# Patient Record
Sex: MALE | Race: WHITE | NOT HISPANIC OR LATINO | URBAN - METROPOLITAN AREA
[De-identification: names, ages, dates, MRNs, and addresses within clinical notes are randomized per-mention and may not be internally consistent; named-entity substitution may affect disease eponyms.]

---

## 2024-03-20 ENCOUNTER — INPATIENT (INPATIENT)
Facility: HOSPITAL | Age: 64
LOS: 0 days | Discharge: HOME | DRG: 390 | End: 2024-03-21
Attending: FAMILY MEDICINE | Admitting: FAMILY MEDICINE
Payer: COMMERCIAL

## 2024-03-20 VITALS
WEIGHT: 174.17 LBS | HEART RATE: 105 BPM | OXYGEN SATURATION: 96 % | TEMPERATURE: 98 F | SYSTOLIC BLOOD PRESSURE: 155 MMHG | HEIGHT: 67 IN | RESPIRATION RATE: 20 BRPM | DIASTOLIC BLOOD PRESSURE: 97 MMHG

## 2024-03-20 DIAGNOSIS — K56.609 UNSPECIFIED INTESTINAL OBSTRUCTION, UNSPECIFIED AS TO PARTIAL VERSUS COMPLETE OBSTRUCTION: ICD-10-CM

## 2024-03-20 DIAGNOSIS — I10 ESSENTIAL (PRIMARY) HYPERTENSION: ICD-10-CM

## 2024-03-20 DIAGNOSIS — Z90.49 ACQUIRED ABSENCE OF OTHER SPECIFIED PARTS OF DIGESTIVE TRACT: Chronic | ICD-10-CM

## 2024-03-20 LAB
ALBUMIN SERPL ELPH-MCNC: 3.5 G/DL — SIGNIFICANT CHANGE UP (ref 3.3–5)
ALP SERPL-CCNC: 74 U/L — SIGNIFICANT CHANGE UP (ref 30–120)
ALT FLD-CCNC: 115 U/L — HIGH (ref 10–60)
ANION GAP SERPL CALC-SCNC: 10 MMOL/L — SIGNIFICANT CHANGE UP (ref 5–17)
APPEARANCE UR: CLEAR — SIGNIFICANT CHANGE UP
APTT BLD: 29.6 SEC — SIGNIFICANT CHANGE UP (ref 24.5–35.6)
AST SERPL-CCNC: 42 U/L — HIGH (ref 10–40)
BACTERIA # UR AUTO: NEGATIVE /HPF — SIGNIFICANT CHANGE UP
BASOPHILS # BLD AUTO: 0.03 K/UL — SIGNIFICANT CHANGE UP (ref 0–0.2)
BASOPHILS NFR BLD AUTO: 0.6 % — SIGNIFICANT CHANGE UP (ref 0–2)
BILIRUB SERPL-MCNC: 0.6 MG/DL — SIGNIFICANT CHANGE UP (ref 0.2–1.2)
BILIRUB UR-MCNC: NEGATIVE — SIGNIFICANT CHANGE UP
BLD GP AB SCN SERPL QL: SIGNIFICANT CHANGE UP
BUN SERPL-MCNC: 36 MG/DL — HIGH (ref 7–23)
CALCIUM SERPL-MCNC: 11 MG/DL — HIGH (ref 8.4–10.5)
CHLORIDE SERPL-SCNC: 108 MMOL/L — SIGNIFICANT CHANGE UP (ref 96–108)
CO2 SERPL-SCNC: 27 MMOL/L — SIGNIFICANT CHANGE UP (ref 22–31)
COLOR SPEC: YELLOW — SIGNIFICANT CHANGE UP
CREAT SERPL-MCNC: 1.51 MG/DL — HIGH (ref 0.5–1.3)
DIFF PNL FLD: ABNORMAL
EGFR: 52 ML/MIN/1.73M2 — LOW
EOSINOPHIL # BLD AUTO: 0.07 K/UL — SIGNIFICANT CHANGE UP (ref 0–0.5)
EOSINOPHIL NFR BLD AUTO: 1.3 % — SIGNIFICANT CHANGE UP (ref 0–6)
EPI CELLS # UR: PRESENT
GLUCOSE SERPL-MCNC: 166 MG/DL — HIGH (ref 70–99)
GLUCOSE UR QL: NEGATIVE MG/DL — SIGNIFICANT CHANGE UP
HCT VFR BLD CALC: 53.8 % — HIGH (ref 39–50)
HGB BLD-MCNC: 17.7 G/DL — HIGH (ref 13–17)
IMM GRANULOCYTES NFR BLD AUTO: 0.2 % — SIGNIFICANT CHANGE UP (ref 0–0.9)
INR BLD: 1.09 RATIO — SIGNIFICANT CHANGE UP (ref 0.85–1.18)
KETONES UR-MCNC: NEGATIVE MG/DL — SIGNIFICANT CHANGE UP
LACTATE SERPL-SCNC: 1.2 MMOL/L — SIGNIFICANT CHANGE UP (ref 0.7–2)
LEUKOCYTE ESTERASE UR-ACNC: NEGATIVE — SIGNIFICANT CHANGE UP
LIDOCAIN IGE QN: 16 U/L — SIGNIFICANT CHANGE UP (ref 16–77)
LYMPHOCYTES # BLD AUTO: 0.42 K/UL — LOW (ref 1–3.3)
LYMPHOCYTES # BLD AUTO: 7.9 % — LOW (ref 13–44)
MCHC RBC-ENTMCNC: 30.8 PG — SIGNIFICANT CHANGE UP (ref 27–34)
MCHC RBC-ENTMCNC: 32.9 GM/DL — SIGNIFICANT CHANGE UP (ref 32–36)
MCV RBC AUTO: 93.7 FL — SIGNIFICANT CHANGE UP (ref 80–100)
MONOCYTES # BLD AUTO: 0.68 K/UL — SIGNIFICANT CHANGE UP (ref 0–0.9)
MONOCYTES NFR BLD AUTO: 12.7 % — SIGNIFICANT CHANGE UP (ref 2–14)
NEUTROPHILS # BLD AUTO: 4.14 K/UL — SIGNIFICANT CHANGE UP (ref 1.8–7.4)
NEUTROPHILS NFR BLD AUTO: 77.3 % — HIGH (ref 43–77)
NITRITE UR-MCNC: NEGATIVE — SIGNIFICANT CHANGE UP
NRBC # BLD: 0 /100 WBCS — SIGNIFICANT CHANGE UP (ref 0–0)
PH UR: 5.5 — SIGNIFICANT CHANGE UP (ref 5–8)
PLATELET # BLD AUTO: 175 K/UL — SIGNIFICANT CHANGE UP (ref 150–400)
POTASSIUM SERPL-MCNC: 4.7 MMOL/L — SIGNIFICANT CHANGE UP (ref 3.5–5.3)
POTASSIUM SERPL-SCNC: 4.7 MMOL/L — SIGNIFICANT CHANGE UP (ref 3.5–5.3)
PROT SERPL-MCNC: 7.2 G/DL — SIGNIFICANT CHANGE UP (ref 6–8.3)
PROT UR-MCNC: SIGNIFICANT CHANGE UP MG/DL
PROTHROM AB SERPL-ACNC: 11.9 SEC — SIGNIFICANT CHANGE UP (ref 9.5–13)
RBC # BLD: 5.74 M/UL — SIGNIFICANT CHANGE UP (ref 4.2–5.8)
RBC # FLD: 12.7 % — SIGNIFICANT CHANGE UP (ref 10.3–14.5)
RBC CASTS # UR COMP ASSIST: 1 /HPF — SIGNIFICANT CHANGE UP (ref 0–4)
SODIUM SERPL-SCNC: 145 MMOL/L — SIGNIFICANT CHANGE UP (ref 135–145)
SP GR SPEC: 1.02 — SIGNIFICANT CHANGE UP (ref 1–1.03)
UROBILINOGEN FLD QL: 1 MG/DL — SIGNIFICANT CHANGE UP (ref 0.2–1)
WBC # BLD: 5.13 K/UL — SIGNIFICANT CHANGE UP (ref 3.8–10.5)
WBC # FLD AUTO: 5.13 K/UL — SIGNIFICANT CHANGE UP (ref 3.8–10.5)
WBC UR QL: 2 /HPF — SIGNIFICANT CHANGE UP (ref 0–5)

## 2024-03-20 PROCEDURE — 99223 1ST HOSP IP/OBS HIGH 75: CPT

## 2024-03-20 PROCEDURE — 93010 ELECTROCARDIOGRAM REPORT: CPT

## 2024-03-20 PROCEDURE — 99285 EMERGENCY DEPT VISIT HI MDM: CPT

## 2024-03-20 PROCEDURE — 74177 CT ABD & PELVIS W/CONTRAST: CPT | Mod: 26,MC

## 2024-03-20 PROCEDURE — 74019 RADEX ABDOMEN 2 VIEWS: CPT | Mod: 26

## 2024-03-20 RX ORDER — DIATRIZOATE MEGLUMINE 180 MG/ML
100 INJECTION, SOLUTION INTRAVESICAL ONCE
Refills: 0 | Status: DISCONTINUED | OUTPATIENT
Start: 2024-03-20 | End: 2024-03-21

## 2024-03-20 RX ORDER — SODIUM CHLORIDE 9 MG/ML
1000 INJECTION INTRAMUSCULAR; INTRAVENOUS; SUBCUTANEOUS ONCE
Refills: 0 | Status: COMPLETED | OUTPATIENT
Start: 2024-03-20 | End: 2024-03-20

## 2024-03-20 RX ORDER — PANTOPRAZOLE SODIUM 20 MG/1
40 TABLET, DELAYED RELEASE ORAL DAILY
Refills: 0 | Status: DISCONTINUED | OUTPATIENT
Start: 2024-03-20 | End: 2024-03-21

## 2024-03-20 RX ORDER — LISINOPRIL 2.5 MG/1
1 TABLET ORAL
Refills: 0 | DISCHARGE

## 2024-03-20 RX ORDER — MORPHINE SULFATE 50 MG/1
4 CAPSULE, EXTENDED RELEASE ORAL ONCE
Refills: 0 | Status: DISCONTINUED | OUTPATIENT
Start: 2024-03-20 | End: 2024-03-20

## 2024-03-20 RX ORDER — ONDANSETRON 8 MG/1
4 TABLET, FILM COATED ORAL EVERY 6 HOURS
Refills: 0 | Status: DISCONTINUED | OUTPATIENT
Start: 2024-03-20 | End: 2024-03-21

## 2024-03-20 RX ORDER — SODIUM CHLORIDE 9 MG/ML
1000 INJECTION, SOLUTION INTRAVENOUS
Refills: 0 | Status: DISCONTINUED | OUTPATIENT
Start: 2024-03-20 | End: 2024-03-21

## 2024-03-20 RX ORDER — IOHEXOL 300 MG/ML
30 INJECTION, SOLUTION INTRAVENOUS ONCE
Refills: 0 | Status: COMPLETED | OUTPATIENT
Start: 2024-03-20 | End: 2024-03-20

## 2024-03-20 RX ORDER — LANOLIN ALCOHOL/MO/W.PET/CERES
3 CREAM (GRAM) TOPICAL AT BEDTIME
Refills: 0 | Status: DISCONTINUED | OUTPATIENT
Start: 2024-03-20 | End: 2024-03-21

## 2024-03-20 RX ORDER — ACETAMINOPHEN 500 MG
650 TABLET ORAL EVERY 6 HOURS
Refills: 0 | Status: DISCONTINUED | OUTPATIENT
Start: 2024-03-20 | End: 2024-03-21

## 2024-03-20 RX ORDER — ONDANSETRON 8 MG/1
4 TABLET, FILM COATED ORAL ONCE
Refills: 0 | Status: COMPLETED | OUTPATIENT
Start: 2024-03-20 | End: 2024-03-20

## 2024-03-20 RX ADMIN — MORPHINE SULFATE 4 MILLIGRAM(S): 50 CAPSULE, EXTENDED RELEASE ORAL at 15:44

## 2024-03-20 RX ADMIN — MORPHINE SULFATE 4 MILLIGRAM(S): 50 CAPSULE, EXTENDED RELEASE ORAL at 15:26

## 2024-03-20 RX ADMIN — SODIUM CHLORIDE 1000 MILLILITER(S): 9 INJECTION INTRAMUSCULAR; INTRAVENOUS; SUBCUTANEOUS at 20:11

## 2024-03-20 RX ADMIN — SODIUM CHLORIDE 1000 MILLILITER(S): 9 INJECTION INTRAMUSCULAR; INTRAVENOUS; SUBCUTANEOUS at 16:59

## 2024-03-20 RX ADMIN — ONDANSETRON 4 MILLIGRAM(S): 8 TABLET, FILM COATED ORAL at 15:25

## 2024-03-20 RX ADMIN — SODIUM CHLORIDE 75 MILLILITER(S): 9 INJECTION, SOLUTION INTRAVENOUS at 23:39

## 2024-03-20 RX ADMIN — IOHEXOL 30 MILLILITER(S): 300 INJECTION, SOLUTION INTRAVENOUS at 15:24

## 2024-03-20 RX ADMIN — SODIUM CHLORIDE 75 MILLILITER(S): 9 INJECTION, SOLUTION INTRAVENOUS at 20:13

## 2024-03-20 RX ADMIN — SODIUM CHLORIDE 1000 MILLILITER(S): 9 INJECTION INTRAMUSCULAR; INTRAVENOUS; SUBCUTANEOUS at 15:24

## 2024-03-20 NOTE — PATIENT PROFILE ADULT - MONEY FOR FOOD
Assuming that the chest tightness noted has been over the course of her bronchitis, would recommend titration of her furosemide to 40 mg daily and limit total fluids to less than 64 oz and also monitor sodium intake.  She was also managed with a course of prednisone which could be causing the weight gain and swelling.  She should stay at the higher dose of furosemide through the weekend and contact us Monday for an update.  Would like her to take an extra tablet today as well.    If the chest tightness is a new symptom not associated with her bronchitis it would be more appropriate to get this re-evaluated in the ER to ensure this is not an ischemic symptom.   no

## 2024-03-20 NOTE — ED ADULT NURSE NOTE - CHIEF COMPLAINT
Patients daughter states that [facility] did not receive the prescription for docusate sodium-sennosides (Senna Plus) 50-8.6 MG per tablet today.  Daughter states that she would like the prescription sent to [facility] instead. Patient was just seen by Dr Schoenich today and the prescription was sent.    Please call daughter if you have any questions.    [id number] - [place] [address] [address] AT [facility]         
Script sent to [facility] per daughters request  
The patient is a 64y Male complaining of abdominal pain.

## 2024-03-20 NOTE — H&P ADULT - PROBLEM SELECTOR PLAN 1
Admit  NPO  Surgery attempted to put NGT but was unable  Will give gastrograffin  IVF  IV PPI  IV Zofran prn  Avoid narcotic pain meds  Surgery consult  Further work-up/management pending clinical course.

## 2024-03-20 NOTE — PATIENT PROFILE ADULT - FALL HARM RISK - UNIVERSAL INTERVENTIONS
Bed in lowest position, wheels locked, appropriate side rails in place/Call bell, personal items and telephone in reach/Instruct patient to call for assistance before getting out of bed or chair/Non-slip footwear when patient is out of bed/Boaz to call system/Physically safe environment - no spills, clutter or unnecessary equipment/Purposeful Proactive Rounding/Room/bathroom lighting operational, light cord in reach

## 2024-03-20 NOTE — ED PROVIDER NOTE - CLINICAL SUMMARY MEDICAL DECISION MAKING FREE TEXT BOX
Patient referred from urgent care for 3 days abdominal pain distention nausea and vomiting.  Patient reports appendectomy at age 15 history of SBO approximately 20 years ago which resolved with an NG tube.  Patient reports fever (Tmax 101.3) at home.  Patient reports had diarrhea but no constipation.  No chest pain shortness of breath cough urinary complaints.    Plan EKG labs abdominal x-ray CT abdomen pelvis with p.o. and IV contrast IV fluids morphine Zofran    Differential including but not limited to SBO constipation perforated viscus electrolyte abnormality dehydration

## 2024-03-20 NOTE — ED PROVIDER NOTE - DIFFERENTIAL DIAGNOSIS
Differential Diagnosis Differential including but not limited to SBO constipation perforated viscus electrolyte abnormality dehydration

## 2024-03-20 NOTE — CONSULT NOTE ADULT - SUBJECTIVE AND OBJECTIVE BOX
contact information: Office: [phone] Cell: [phone]    GENERAL SURGERY CONSULT NOTE    Patient is a 64y old  Male who presents with a chief complaint of abdominal pain with N/V since Sunday night  patient with h/o open appendectomy at age 16 , presented with 3 day h/o abdominal pain and vomiting, decrease appetite and diarrhea. patient with recent out patient buttock cyst removal under anesthesia states he has been constipated since surgery last Wednesday. patient then noted fever and chills starting sat night combined with diarrhea which was diagnosed by urgent care as enteritis. patient has not have any fevers since Monday but continues to have few episode of non bloody diarrhea and few episode of vomiting. patient w + flatus through out.   off note patient was admitted w SBO due to hydroureter which was resolved with no surgical intervention.  no other complaints at this time                < from: CT Abdomen and Pelvis w/ Oral Cont and w/ IV Cont (03.20.24 @ 17:55) >  BLADDER: Within normal limits.  REPRODUCTIVE ORGANS: Mildly enlarged prostate.    BOWEL: Dilated long segment of small bowel concerningfor small bowel   obstruction. A transition point is seen along the anterior abdominal wall   on series 3 image 91). There is no pneumatosis intestinalis or portal   venous gas. No abnormal bowel thickening or interval fluid. Appendix is   not visualized. No evidence of inflammation in the pericecal region.   Extensive colonic diverticulosis without diverticulitis.  PERITONEUM: No ascites.  VESSELS: Within normal limits.  RETROPERITONEUM/LYMPH NODES: No lymphadenopathy.  ABDOMINAL WALL: 3.5 cm fat-containing umbilical hernia..  BONES: Degenerative changes.    IMPRESSION:  Small bowel obstruction identified with transition point along the   anterior abdominal wall most likely related to adhesion. No evidence of   bowel ischemia at this time.              PAST MEDICAL & SURGICAL HISTORY:  HTN (hypertension)      SBO (small bowel obstruction)      History of appendectomy          Review of Systems:    I have reviewed 9 systems with the patient and the only positive findings were as above     MEDICATIONS  (STANDING):    MEDICATIONS  (PRN):      Allergies    No Known Allergies    Intolerances        SOCIAL HISTORY          Smoking: Yes [ ]  No [ ]   ______pk yrs          ETOH  Yes [ ]  No [ ]  Social [ ]          DRUGS:  Yes [ ]  No [ ]  if so what______________    FAMILY HISTORY:      Vital Signs Last 24 Hrs  T(C): 36.5 (20 Mar 2024 14:38), Max: 36.5 (20 Mar 2024 14:38)  T(F): 97.7 (20 Mar 2024 14:38), Max: 97.7 (20 Mar 2024 14:38)  HR: 105 (20 Mar 2024 14:38) (105 - 105)  BP: 155/97 (20 Mar 2024 14:38) (155/97 - 155/97)  BP(mean): --  RR: 20 (20 Mar 2024 14:38) (20 - 20)  SpO2: 96% (20 Mar 2024 14:38) (96% - 96%)    Parameters below as of 20 Mar 2024 14:38  Patient On (Oxygen Delivery Method): room air        Physical Exam:    General:  Appears stated age, well-groomed, well-nourished, no distress  Eyes : DON  HENT:  WNL, no JVD  Chest:  clear breath sounds good inspiratory effort   Cardiovascular:  Regular rate & rhythm  Abdomen:  distended , mildly tympanic, + bs with large umbilical hernia , no guarding no rebound   Extremities:  no edema , good capillary refill   Skin:  No rash  Musculoskeletal:  normal strength  Neuro/Psych:  Alert, oriented tp time, place and person       LABS:                        17.7   5.13  )-----------( 175      ( 20 Mar 2024 15:05 )             53.8     03-20    145  |  108  |  36<H>  ----------------------------<  166<H>  4.7   |  27  |  1.51<H>    Ca    11.0<H>      20 Mar 2024 15:05    TPro  7.2  /  Alb  3.5  /  TBili  0.6  /  DBili  x   /  AST  42<H>  /  ALT  115<H>  /  AlkPhos  74  03-20    PT/INR - ( 20 Mar 2024 15:05 )   PT: 11.9 sec;   INR: 1.09 ratio         PTT - ( 20 Mar 2024 15:05 )  PTT:29.6 sec  Urinalysis Basic - ( 20 Mar 2024 15:05 )    Color: x / Appearance: x / SG: x / pH: x  Gluc: 166 mg/dL / Ketone: x  / Bili: x / Urobili: x   Blood: x / Protein: x / Nitrite: x   Leuk Esterase: x / RBC: x / WBC x   Sq Epi: x / Non Sq Epi: x / Bacteria: x

## 2024-03-20 NOTE — ED ADULT NURSE NOTE - OBJECTIVE STATEMENT
pt comes to ed sent from urgent care for CT to r/o SBO. Pt c/o abd pain, bloating x few days worse yesterday with some n/v. pt states had some diarrhea yesterday and is able to pass gas currently. pt denies, back pain, neck pain, recent travel, sick contacts, headache, numbness, tingling, weakness, blurred vision, sinus congestion, fevers, chills, body aches, hematuria, dizziness, chest pain, sob, trouble speaking, trouble walking or any other complaints.

## 2024-03-20 NOTE — ED PROVIDER NOTE - NS ED MD DISPO ISOLATION TYPES
Patient has chest pain that started yesterday afternoon.  She complains of right side pain into her shoulder and nausea    Patient was advised and in agreement they do not meet Urgent Care criteria based on triage symptoms.   
None

## 2024-03-20 NOTE — ED ADULT NURSE REASSESSMENT NOTE - NS ED NURSE REASSESS COMMENT FT1
surgery RAFFY No at bedside attempting NG tube
pt resting comfortably, in no acute distress. Respirations are even and unlabored. unable to place NG tube at bedside, RAFFY Martin and Dr. Issa made aware. plan of care ongoing, no further concerns as of present, pt repositioned in bed, pt hemodynamically stable. no acute changes noted, safety maintained, call bell within reach.

## 2024-03-20 NOTE — PROGRESS NOTE ADULT - ASSESSMENT
I was called by ER about the pt.,who has 3  days ho abd pain , N/V and abdominal distension.  Pt. had appendectomy at the age of 15.  In 2011 was seen in the ER for abd distension, possible sbo vs an ileus, which resolved without surgical intervention. Ambrose episode coincided with renal stone ambrose cause hydrouretrr and require a placement of ureteral stent..  Since that time had no GI issues.  AXR shows dilated sb loops. He is awaiting ct scan.  His labs  reflect renal compromise with elevated BUN/Cr. normal lactic acid. His wbc is normal with elevation of neutrophils .  Pt. should be rehydrated.   Ngt should be placed.  U/A should be obtained.  Picture could be secondary to sbo vs ileus eduardo. since there is a history of previous renal stones causing hydroureter.   Will follow ct.  IF U/A or ct are positive for blood, infection, hydroureter the  consult would be required.  Full consult to follow. I was called by ER about the pt.,who has 3  days ho abd pain , N/V and abdominal distension. Pt is passing flatus and had diarrhea yesterday  Pt. had appendectomy at the age of 15.  In 2011 was seen in the ER for abd distension, possible sbo vs an ileus, which resolved without surgical intervention. That episode coincided with renal stone ambrose cause hydrouretrr and require a placement of ureteral stent..  Since that time had no GI issues.  AXR shows dilated sb loops. He is awaiting ct scan.  His labs  reflect renal compromise with elevated BUN/Cr. normal lactic acid. His wbc is normal with elevation of neutrophils .  Pt. should be rehydrated.   Ngt should be placed.  U/A should be obtained.  Picture could be secondary to sbo vs ileus eduardo. since there is a history of previous renal stones causing hydroureter.   Will follow ct.  IF U/A or ct are positive for blood, infection, hydroureter the  consult would be required.  Full consult to follow.

## 2024-03-20 NOTE — ED ADULT TRIAGE NOTE - CHIEF COMPLAINT QUOTE
" I have abdominal pain - worse last night  " Pt sent in  from an urgent care for nausea , vomiting and abdominal pain x 3 days now  PMH Bowel Obstruction

## 2024-03-20 NOTE — H&P ADULT - PROBLEM SELECTOR PLAN 2
Patient currently normotensive  Will hold off on meds for now -- if needed can start iv lopressor 2.5mg ivp q6h

## 2024-03-20 NOTE — ED PROVIDER NOTE - CPE EDP RESP NORM
[facility],    Thank you for allowing [facility] to manage your care.      I sent your prescriptions to your pharmacy.      I made a referral, they will be calling in approximately 1 week to set up your appointment.  If you do not hear from them, please call the specialty number on your after visit.     For your convenience, test results are released as soon as they are available  Please allow 1-2 business days for me to send you a comment about your results.  If not done so, I encourage you to login into [employer] ([email or web address] to review your results in real time.     If you have any questions or concerns, please feel free to call us at [phone].    Sincerely,    Dr. Alfaro    Did you know?      You can schedule a video visit for follow-up appointments as well as future appointments for certain conditions.  Please see the below link.     [email or web address]    If you have not already done so,  I encourage you to sign up for [employer] ([email or web address]).  This will allow you to review your results, securely communicate with a provider, and schedule virtual visits as well.     normal...

## 2024-03-20 NOTE — ED PROVIDER NOTE - PROGRESS NOTE DETAILS
dw dr metz (surgery attending) who states NG tube and admit  dw dr THAD wilson (hospitalist attending) who accepts (covering alamuri)

## 2024-03-20 NOTE — H&P ADULT - HISTORY OF PRESENT ILLNESS
This is a 64 M with PMH of HTN, open appendectomy at age 15, SBO ~20 years ago (resolved with NG tube) hypercalcemia (?2/2 to hyperparathyroidism) who came in c/o abdominal pain, distension, nausea, vomiting x 3 days. Patient reports that he had a buttock cyst removed 8 days ago. He reports being constipated since then. Tmax 101.3F at home two days ago. Decreased PO intake due to pain. Small amounts of diarrhea. Passing flatus. Denies cp, sob, urinary complaints. He went to urgent care yesterday and diagnosed with enteritis. Today he felt worse so he came to the ER.

## 2024-03-20 NOTE — ED PROVIDER NOTE - OBJECTIVE STATEMENT
64 M hx htn, appendectomy age 15, SBO ~20 years ago (resolved with NG tube) sent from urgent care for abd pain, distension, nausea, vomiting x 3 days. Tmax 101.3F at home. Decreased PO intake due to pain. Small amounts of diarrhea. Passing flatus. Denies cp, sob, urinary complaints.

## 2024-03-20 NOTE — ED PROVIDER NOTE - CONSTITUTIONAL, MLM
normal... Well appearing, awake, alert, oriented to person, place, time/situation in mild distress from pain.

## 2024-03-20 NOTE — CONSULT NOTE ADULT - ASSESSMENT
65 yo male with h/o open appendectomy and renal stone presented with abdominal pain , diarrhea and N/V since Sunday night after diagnosis of enteritis and prior complaints of constipation post out patient surgical procedure on Wednesday.   plan:  admit to medical service   will decompress w NGT placement although patient is very apprehensive about NGT placement due to prior experience  hydration  U/A   serial abdominal exam    possible gastrografin challenge in AM

## 2024-03-21 ENCOUNTER — TRANSCRIPTION ENCOUNTER (OUTPATIENT)
Age: 64
End: 2024-03-21

## 2024-03-21 VITALS
DIASTOLIC BLOOD PRESSURE: 83 MMHG | HEART RATE: 80 BPM | SYSTOLIC BLOOD PRESSURE: 156 MMHG | OXYGEN SATURATION: 93 % | RESPIRATION RATE: 18 BRPM | TEMPERATURE: 98 F

## 2024-03-21 LAB
ALBUMIN SERPL ELPH-MCNC: 2.9 G/DL — LOW (ref 3.3–5)
ALP SERPL-CCNC: 58 U/L — SIGNIFICANT CHANGE UP (ref 30–120)
ALT FLD-CCNC: 80 U/L — HIGH (ref 10–60)
ANION GAP SERPL CALC-SCNC: 8 MMOL/L — SIGNIFICANT CHANGE UP (ref 5–17)
AST SERPL-CCNC: 31 U/L — SIGNIFICANT CHANGE UP (ref 10–40)
BILIRUB DIRECT SERPL-MCNC: 0.2 MG/DL — SIGNIFICANT CHANGE UP (ref 0–0.3)
BILIRUB INDIRECT FLD-MCNC: 0.4 MG/DL — SIGNIFICANT CHANGE UP (ref 0.2–1)
BILIRUB SERPL-MCNC: 0.6 MG/DL — SIGNIFICANT CHANGE UP (ref 0.2–1.2)
BUN SERPL-MCNC: 32 MG/DL — HIGH (ref 7–23)
CALCIUM SERPL-MCNC: 10 MG/DL — SIGNIFICANT CHANGE UP (ref 8.4–10.5)
CHLORIDE SERPL-SCNC: 113 MMOL/L — HIGH (ref 96–108)
CO2 SERPL-SCNC: 26 MMOL/L — SIGNIFICANT CHANGE UP (ref 22–31)
CREAT SERPL-MCNC: 1.24 MG/DL — SIGNIFICANT CHANGE UP (ref 0.5–1.3)
EGFR: 65 ML/MIN/1.73M2 — SIGNIFICANT CHANGE UP
GLUCOSE SERPL-MCNC: 86 MG/DL — SIGNIFICANT CHANGE UP (ref 70–99)
HCT VFR BLD CALC: 44.5 % — SIGNIFICANT CHANGE UP (ref 39–50)
HCV AB S/CO SERPL IA: 0.09 S/CO — SIGNIFICANT CHANGE UP (ref 0–0.99)
HCV AB SERPL-IMP: SIGNIFICANT CHANGE UP
HGB BLD-MCNC: 14.8 G/DL — SIGNIFICANT CHANGE UP (ref 13–17)
MAGNESIUM SERPL-MCNC: 1.6 MG/DL — SIGNIFICANT CHANGE UP (ref 1.6–2.6)
MCHC RBC-ENTMCNC: 31.4 PG — SIGNIFICANT CHANGE UP (ref 27–34)
MCHC RBC-ENTMCNC: 33.3 GM/DL — SIGNIFICANT CHANGE UP (ref 32–36)
MCV RBC AUTO: 94.5 FL — SIGNIFICANT CHANGE UP (ref 80–100)
NRBC # BLD: 0 /100 WBCS — SIGNIFICANT CHANGE UP (ref 0–0)
PLATELET # BLD AUTO: 169 K/UL — SIGNIFICANT CHANGE UP (ref 150–400)
POTASSIUM SERPL-MCNC: 4.2 MMOL/L — SIGNIFICANT CHANGE UP (ref 3.5–5.3)
POTASSIUM SERPL-SCNC: 4.2 MMOL/L — SIGNIFICANT CHANGE UP (ref 3.5–5.3)
PROT SERPL-MCNC: 5.9 G/DL — LOW (ref 6–8.3)
RBC # BLD: 4.71 M/UL — SIGNIFICANT CHANGE UP (ref 4.2–5.8)
RBC # FLD: 12.7 % — SIGNIFICANT CHANGE UP (ref 10.3–14.5)
SODIUM SERPL-SCNC: 147 MMOL/L — HIGH (ref 135–145)
WBC # BLD: 3.8 K/UL — SIGNIFICANT CHANGE UP (ref 3.8–10.5)
WBC # FLD AUTO: 3.8 K/UL — SIGNIFICANT CHANGE UP (ref 3.8–10.5)

## 2024-03-21 PROCEDURE — 83690 ASSAY OF LIPASE: CPT

## 2024-03-21 PROCEDURE — 80048 BASIC METABOLIC PNL TOTAL CA: CPT

## 2024-03-21 PROCEDURE — 99233 SBSQ HOSP IP/OBS HIGH 50: CPT

## 2024-03-21 PROCEDURE — 86803 HEPATITIS C AB TEST: CPT

## 2024-03-21 PROCEDURE — 80076 HEPATIC FUNCTION PANEL: CPT

## 2024-03-21 PROCEDURE — 85730 THROMBOPLASTIN TIME PARTIAL: CPT

## 2024-03-21 PROCEDURE — 85025 COMPLETE CBC W/AUTO DIFF WBC: CPT

## 2024-03-21 PROCEDURE — 96375 TX/PRO/DX INJ NEW DRUG ADDON: CPT

## 2024-03-21 PROCEDURE — 74019 RADEX ABDOMEN 2 VIEWS: CPT

## 2024-03-21 PROCEDURE — 86901 BLOOD TYPING SEROLOGIC RH(D): CPT

## 2024-03-21 PROCEDURE — 36415 COLL VENOUS BLD VENIPUNCTURE: CPT

## 2024-03-21 PROCEDURE — 80053 COMPREHEN METABOLIC PANEL: CPT

## 2024-03-21 PROCEDURE — 74177 CT ABD & PELVIS W/CONTRAST: CPT | Mod: MC

## 2024-03-21 PROCEDURE — 83605 ASSAY OF LACTIC ACID: CPT

## 2024-03-21 PROCEDURE — 87040 BLOOD CULTURE FOR BACTERIA: CPT

## 2024-03-21 PROCEDURE — 99285 EMERGENCY DEPT VISIT HI MDM: CPT

## 2024-03-21 PROCEDURE — 85027 COMPLETE CBC AUTOMATED: CPT

## 2024-03-21 PROCEDURE — 81001 URINALYSIS AUTO W/SCOPE: CPT

## 2024-03-21 PROCEDURE — 86850 RBC ANTIBODY SCREEN: CPT

## 2024-03-21 PROCEDURE — 83735 ASSAY OF MAGNESIUM: CPT

## 2024-03-21 PROCEDURE — 86900 BLOOD TYPING SEROLOGIC ABO: CPT

## 2024-03-21 PROCEDURE — 85610 PROTHROMBIN TIME: CPT

## 2024-03-21 PROCEDURE — 93005 ELECTROCARDIOGRAM TRACING: CPT

## 2024-03-21 PROCEDURE — 96374 THER/PROPH/DIAG INJ IV PUSH: CPT

## 2024-03-21 PROCEDURE — 74019 RADEX ABDOMEN 2 VIEWS: CPT | Mod: 26

## 2024-03-21 RX ORDER — POLYETHYLENE GLYCOL 3350 17 G/17G
17 POWDER, FOR SOLUTION ORAL
Refills: 0 | Status: DISCONTINUED | OUTPATIENT
Start: 2024-03-21 | End: 2024-03-21

## 2024-03-21 RX ORDER — POLYETHYLENE GLYCOL 3350 17 G/17G
17 POWDER, FOR SOLUTION ORAL ONCE
Refills: 0 | Status: COMPLETED | OUTPATIENT
Start: 2024-03-21 | End: 2024-03-21

## 2024-03-21 RX ADMIN — SODIUM CHLORIDE 75 MILLILITER(S): 9 INJECTION, SOLUTION INTRAVENOUS at 10:54

## 2024-03-21 RX ADMIN — POLYETHYLENE GLYCOL 3350 17 GRAM(S): 17 POWDER, FOR SOLUTION ORAL at 10:54

## 2024-03-21 RX ADMIN — PANTOPRAZOLE SODIUM 40 MILLIGRAM(S): 20 TABLET, DELAYED RELEASE ORAL at 10:55

## 2024-03-21 NOTE — CARE COORDINATION ASSESSMENT. - NSDCPLANSERVICES_GEN_ALL_CORE
PATIENT SITTING UP IN BED TAKING HIS BREAKFAST. VITAL SIGNS AND I&O DONE. CALL
LIGHT WITHIN REACH. NO OTHER NEEDS AT THIS TIME This is a 64 M with PMH of HTN, open appendectomy at age 15, SBO ~20 years ago (resolved with NG tube) hypercalcemia (?2/2 to hyperparathyroidism) who came in c/o abdominal pain, distension, nausea, vomiting x 3 days. Patient reports that he had a buttock cyst removed 8 days ago. He reports being constipated since then.  CM met with patient at bedside.   Pt  resting comfortably / Pt has no complaints at this time.  CM explained role of CM and availability to assist with discharge planning throughout stay.   Provided CM contact information and pt. verbalized understanding. Patient lives in a private house with his wife, 9 steps with HR to navigate. Prior to admission patient was ind in adls including driving and working. Patient denies ant services ant at home and DME'S. Patient identified his wife  as his caregiver at home as needed, but patient states he is ind at this time. Anticipated needs not clear at this time, anticipate no needs for discharge.  CM explained home care expectations, process, insurance provisions and home safety with good understanding.   Offered list of [facility] and pt. chose [facility] at home care agency.  Provided discharge resources folder. CM will make  referral accordingly if appropriate.   Pt verbalizing understanding and in agreement with d/c plans.  pcp: Mert Banks  pharmacy trans transcript [phone]/Anticipated Needs Unclear at Present

## 2024-03-21 NOTE — DISCHARGE NOTE PROVIDER - INSTRUCTIONS
Full liquid diet today and tomorrow  Continue full liquid diet on 3/21 and 3/22. Advance to solid foods Saturday, March 23rd.

## 2024-03-21 NOTE — DISCHARGE NOTE NURSING/CASE MANAGEMENT/SOCIAL WORK - PATIENT PORTAL LINK FT
You can access the FollowMyHealth Patient Portal offered by [facility] by registering at the following website: [email or web address]. By joining [employer]’s FollowMyHealth portal, you will also be able to view your health information using other applications (apps) compatible with our system.

## 2024-03-21 NOTE — DISCHARGE NOTE PROVIDER - CARE PROVIDER_API CALL
Randy Cartagena [facility]  Surgery  [address], [address]  [place] [address]  Phone: [phone]  Fax: [phone]  Follow Up Time:

## 2024-03-21 NOTE — CHART NOTE - NSCHARTNOTEFT_GEN_A_CORE
Do you have Advance Directives (HCP / LV / Organ donation / Documentation of oral advance Directive):   ( x   )  yes    (      )    NO                                                                            Do you have LV - Living will :                                                                                                                                             (   x )  yes    (      )   No    Do you have HCP - Health Care Proxy:                                                                                                                            (  x   )  yes   (       ) N0    Do you have DNR- Do Not Resuscitate :                                                                                                                           (      )  yes  (      x  )  No    Do you have DNI- Do Not intubate  :                                                                                                                               (      )  yes   (    x   ) No    Do you have MOLST - Medical orders for Life sustaining treatment  :                                                                    (      ) yes    (    x   ) No    Decision Maker :  (  x   ) Patient     (      )  HCA   (     ) Public Health Law Surrogate     (      ) Surrogate  (       ) Guardian    Goals of Care :  (  x    )   Complete Care     (       ) No Limitations                              (       )   Comfort Care       (       )  Hospice                               (      )   Limited medical Intervention / s    Medical Interventions :   (  x      )   CPR       (        )  DNR                                               (   x     )  Intubation with MV - Mechanical Ventilation  (   x   ) BIPAP/CPAP    (         )   DNI                                               (    x     )  Artificial Nutrition -  IVF, TPN / PPN, Tube Feeds             (         )   No Feeding Tube                                                (    x    ) Use Antibiotics                         (          ) No Antibiotics                                                (       x  ) Blood and Blood Products     (         )   No Blood or Blood products                                                (    x      )  Dialysis                                    (         )  No Dialysis                                                (          )  Medical Management only  (         )  No Invasive Interventions or Surgery  Time spent :                        (  x     ) upto 30 minutes                       (           )   more than 30 minutes  ACP reviewed and discussed

## 2024-03-21 NOTE — DISCHARGE NOTE PROVIDER - HOSPITAL COURSE
This is a 64 M with PMH of HTN, open appendectomy at age 15, SBO ~20 years ago (resolved with NG tube) hypercalcemia (?2/2 to hyperparathyroidism) who came in c/o abdominal pain, distension, nausea, vomiting x 3 days. Patient reports that he had a buttock cyst removed 8 days ago. He reports being constipated since then. Tmax 101.3F at home two days ago. Decreased PO intake due to pain. Small amounts of diarrhea. Passing flatus. Denies cp, sob, urinary complaints. He went to urgent care yesterday and diagnosed with enteritis. Today he felt worse so he came to the ER.  Patient now with improvement in abdominal distention, denies pain, tolerate full liquid diet    This is a 64 M with PMH of HTN, open appendectomy at age 15, SBO ~20 years ago (resolved with NG tube) hypercalcemia (?2/2 to hyperparathyroidism) who came in c/o abdominal pain, distension, nausea, vomiting x 3 days. Patient reports that he had a buttock cyst removed 8 days ago. He reports being constipated since then. Tmax 101.3F at home two days ago. Decreased PO intake due to pain. Small amounts of diarrhea. Passing flatus. Denies cp, sob, urinary complaints. He went to urgent care yesterday and diagnosed with enteritis. Today he felt worse so he came to the ER.      Patient admitted for SBO and made him NPO and started on IVF.  Conservative management with NGT placement for gastric decompression attempted,   however, patient adamantly refused NGT placement. Patient kept NPO for bowel rest and encouraged active ambulation and antiemetic as needed.  Patient progressed well, and currently has return of bowel functions.   Patient now with improvement in abdominal distention, denies pain, tolerate full liquid diet    This is a 64 M with PMH of HTN, open appendectomy at age 15, SBO ~20 years ago (resolved with NG tube) hypercalcemia (?2/2 to hyperparathyroidism) who came in c/o abdominal pain, distension, nausea, vomiting x 3 days. Patient reports that he had a buttock cyst removed 8 days ago. He reports being constipated since then. Tmax 101.3F at home two days ago. Decreased PO intake due to pain. Small amounts of diarrhea. Passing flatus. Denies cp, sob, urinary complaints. He went to urgent care yesterday and diagnosed with enteritis. Today he felt worse so he came to the ER.      Patient admitted for SBO, made him NPO and started on IVF.  Conservative management with NGT placement for gastric decompression attempted,   however, patient adamantly refused NGT placement. Patient kept NPO for bowel rest and encouraged active ambulation and antiemetic as needed.   Patient progressed well, currently has return of bowel functions, abdominal pain resolved with soft, non-distended, non-tender abdomen.  Patient currently   tolerating diet without any issues and continues to show return of bowel functions. Patient is clinically stable for discharge at this time.

## 2024-03-21 NOTE — CARE COORDINATION ASSESSMENT. - NSPASTMEDSURGHISTORY_GEN_ALL_CORE_FT
PAST MEDICAL & SURGICAL HISTORY:  SBO (small bowel obstruction)      HTN (hypertension)      History of appendectomy

## 2024-03-21 NOTE — PATIENT CHOICE NOTE. - NSPTCHOICESTATE_GEN_ALL_CORE

## 2024-03-21 NOTE — CONSULT NOTE ADULT - ASSESSMENT
SBO  Passing flatus  No BM yet    PLAN  Pending repeat abdominal xray  Keep NPO  Continue IVF  Antiemetics PRN  Will follow    Discussed with Dr. Sky.   SBO  Passing flatus  No BM yet    PLAN  Pending repeat abdominal xray  Keep NPO  Continue IVF  Antiemetics PRN  Will follow    Advanced care planning was discussed with patient and family.  Advanced care planning forms were reviewed and discussed.  Risks, benefits and alternatives of gastroenterologic procedures were discussed in detail and all questions were answered.    30 minutes spent.     Discussed with Dr. Sky.

## 2024-03-21 NOTE — PROGRESS NOTE ADULT - SUBJECTIVE AND OBJECTIVE BOX
SURGERY PA NOTE ON BEHALF OF DR. Melgar / General SURGERY:    S: Patient seen and examined at bedside.     Denies any abdominal pain.   Feels hungry and wants to eat.   Denies fever, chills, nausea, vomiting, chest pain, SOb.   Ambulating, voiding.   Passing multiple flatus, no BM overnight.    He repeatedly states he refuses NGT placement.     MEDICATIONS:  acetaminophen     Tablet .. 650 milliGRAM(s) Oral every 6 hours PRN  aluminum hydroxide/magnesium hydroxide/simethicone Suspension 30 milliLiter(s) Oral every 4 hours PRN  diatrizoate meglumine/diatrizoate sodium. 100 milliLiter(s) Oral once  lactated ringers. 1000 milliLiter(s) IV Continuous <Continuous>  melatonin 3 milliGRAM(s) Oral at bedtime PRN  ondansetron Injectable 4 milliGRAM(s) IV Push every 6 hours PRN  pantoprazole  Injectable 40 milliGRAM(s) IV Push daily      O:  Vital Signs Last 24 Hrs  T(C): 36.6 (21 Mar 2024 08:14), Max: 36.7 (20 Mar 2024 23:09)  T(F): 97.9 (21 Mar 2024 08:14), Max: 98.1 (20 Mar 2024 23:09)  HR: 80 (21 Mar 2024 08:14) (80 - 105)  BP: 128/79 (21 Mar 2024 08:14) (128/72 - 155/97)  BP(mean): --  RR: 16 (21 Mar 2024 08:14) (16 - 20)  SpO2: 93% (21 Mar 2024 08:14) (92% - 96%)    Parameters below as of 21 Mar 2024 08:14  Patient On (Oxygen Delivery Method): room air        I&O SUMMARY:    03-20-24 @ 07:01  -  03-21-24 @ 07:00  --------------------------------------------------------  IN: 450 mL / OUT: 800 mL / NET: -350 mL        PHYSICAL EXAM:  Lungs: CTA bilat without W/R/R  Card: S1S2  Abd: Soft, NT, ND.  No rebound/guarding.    Ext: Calves soft, NT, without edema bilat    LABS:                        17.7   5.13  )-----------( 175      ( 20 Mar 2024 15:05 )             53.8     03-20    145  |  108  |  36<H>  ----------------------------<  166<H>  4.7   |  27  |  1.51<H>    Ca    11.0<H>      20 Mar 2024 15:05    TPro  7.2  /  Alb  3.5  /  TBili  0.6  /  DBili  x   /  AST  42<H>  /  ALT  115<H>  /  AlkPhos  74  03-20    PT/INR - ( 20 Mar 2024 15:05 )   PT: 11.9 sec;   INR: 1.09 ratio         PTT - ( 20 Mar 2024 15:05 )  PTT:29.6 sec          RADIOLOGY:   < from: CT Abdomen and Pelvis w/ Oral Cont and w/ IV Cont (03.20.24 @ 17:55) >  IMPRESSION:  Small bowel obstruction identified with transition point along the   anterior abdominal wall most likely related to adhesion. No evidence of   bowel ischemia at this time.        --- End of Report ---    < end of copied text >      A:  65 yo male with hx of appendectomy admitted with SBO.  Adamantly refused NGT placement.   Afebrile, hemodynamically stable.   Denies any abdominal pain this AM, no nausea, no vomiting overnight.   Passing flatus, but No BM yet.   Abdomen, soft, non-tender, non-distended. No peritonitic sign.         P:  - Abdominal X-ray  - Continue NPO and IVF for now  - follow up with AM labs  - OOB ad shelli and encourage ambulation.   - Pain control prn.   - antiemetic if needed.   - Possible Gastrografin challenge this AM.   - Case and plan to be discussed with Dr. Melgar

## 2024-03-21 NOTE — CONSULT NOTE ADULT - SUBJECTIVE AND OBJECTIVE BOX
Chief Complaint:  Patient is a 64y old  Male who presents with a chief complaint of abdominal pain (21 Mar 2024 08:40)      HPI:    Allergies:  No Known Allergies      Medications:  acetaminophen     Tablet .. 650 milliGRAM(s) Oral every 6 hours PRN  aluminum hydroxide/magnesium hydroxide/simethicone Suspension 30 milliLiter(s) Oral every 4 hours PRN  lactated ringers. 1000 milliLiter(s) IV Continuous <Continuous>  melatonin 3 milliGRAM(s) Oral at bedtime PRN  ondansetron Injectable 4 milliGRAM(s) IV Push every 6 hours PRN  pantoprazole  Injectable 40 milliGRAM(s) IV Push daily      PMHX/PSHX:  HTN (hypertension)    SBO (small bowel obstruction)    History of appendectomy        Family history:      Social History:     ROS:     General:  No wt loss, fevers, chills, night sweats, fatigue,   Eyes:  Good vision, no reported pain  ENT:  No sore throat, pain, runny nose, dysphagia  CV:  No pain, palpitations, hypo/hypertension  Resp:  No dyspnea, cough, tachypnea, wheezing  GI:  No pain, No nausea, No vomiting, No diarrhea, No constipation, No weight loss, No fever, No pruritis, No rectal bleeding, No tarry stools, No dysphagia,  :  No pain, bleeding, incontinence, nocturia  Muscle:  No pain, weakness  Neuro:  No weakness, tingling, memory problems  Psych:  No fatigue, insomnia, mood problems, depression  Endocrine:  No polyuria, polydipsia, cold/heat intolerance  Heme:  No petechiae, ecchymosis, easy bruisability  Skin:  No rash, tattoos, scars, edema      PHYSICAL EXAM:   Vital Signs:  Vital Signs Last 24 Hrs  T(C): 36.6 (21 Mar 2024 08:14), Max: 36.7 (20 Mar 2024 23:09)  T(F): 97.9 (21 Mar 2024 08:14), Max: 98.1 (20 Mar 2024 23:09)  HR: 80 (21 Mar 2024 08:14) (80 - 105)  BP: 128/79 (21 Mar 2024 08:14) (128/72 - 155/97)  BP(mean): --  RR: 16 (21 Mar 2024 08:14) (16 - 20)  SpO2: 93% (21 Mar 2024 08:14) (92% - 96%)    Parameters below as of 21 Mar 2024 08:14  Patient On (Oxygen Delivery Method): room air      Daily Height in cm: 170.18 (20 Mar 2024 14:38)    Daily     GENERAL:  Appears stated age, well-groomed, well-nourished, no distress  HEENT:  NC/AT,  conjunctivae clear and pink, no thyromegaly, nodules, adenopathy, no JVD, sclera -anicteric  CHEST:  Full & symmetric excursion, no increased effort, breath sounds clear  HEART:  Regular rhythm, S1, S2, no murmur/rub/S3/S4, no abdominal bruit, no edema  ABDOMEN:  Soft, non-tender, non-distended, normoactive bowel sounds,  no masses ,no hepato-splenomegaly, no signs of chronic liver disease  EXTEREMITIES:  no cyanosis,clubbing or edema  SKIN:  No rash/erythema/ecchymoses/petechiae/wounds/abscess/warm/dry  NEURO:  Alert, oriented, no asterixis, no tremor, no encephalopathy    LABS:                        14.8   3.80  )-----------( 169      ( 21 Mar 2024 09:03 )             44.5     03-21    147<H>  |  113<H>  |  32<H>  ----------------------------<  86  4.2   |  26  |  1.24    Ca    10.0      21 Mar 2024 09:03  Mg     1.6     03-21    TPro  5.9<L>  /  Alb  2.9<L>  /  TBili  0.6  /  DBili  0.2  /  AST  31  /  ALT  80<H>  /  AlkPhos  58  03-21    LIVER FUNCTIONS - ( 21 Mar 2024 09:03 )  Alb: 2.9 g/dL / Pro: 5.9 g/dL / ALK PHOS: 58 U/L / ALT: 80 U/L / AST: 31 U/L / GGT: x           PT/INR - ( 20 Mar 2024 15:05 )   PT: 11.9 sec;   INR: 1.09 ratio         PTT - ( 20 Mar 2024 15:05 )  PTT:29.6 sec  Urinalysis Basic - ( 21 Mar 2024 09:03 )    Color: x / Appearance: x / SG: x / pH: x  Gluc: 86 mg/dL / Ketone: x  / Bili: x / Urobili: x   Blood: x / Protein: x / Nitrite: x   Leuk Esterase: x / RBC: x / WBC x   Sq Epi: x / Non Sq Epi: x / Bacteria: x      Amylase Serum--      Lipase serum16       Ammonia--      Imaging:             Chief Complaint:  Patient is a 64y old  Male who presents with a chief complaint of abdominal pain (21 Mar 2024 08:40)      HPI:  64 M with pmhx of HTN, open appendectomy at age 15, SBO ~20 years ago (resolved with NG tube) hypercalcemia (?2/2 to hyperparathyroidism) admitted for SBO. Was seen @ [facility] yesterday and dx with enteritis, Presented to ED yesterday for evaluation of abdominal pain/distention, constipation, nausea, and vomiting. Symptoms began s/p buttock cyst removal last week. Reports he is "in recovery", and thinks anesthesia he was given set off symptoms. Passing gas today, however no BM yet. Pt denies headache, dizziness, chest pain, palpitations, cough, SOB, urinary symptoms, fevers, chills, weakness at this time.     Allergies:  No Known Allergies      Medications:  acetaminophen     Tablet .. 650 milliGRAM(s) Oral every 6 hours PRN  aluminum hydroxide/magnesium hydroxide/simethicone Suspension 30 milliLiter(s) Oral every 4 hours PRN  lactated ringers. 1000 milliLiter(s) IV Continuous <Continuous>  melatonin 3 milliGRAM(s) Oral at bedtime PRN  ondansetron Injectable 4 milliGRAM(s) IV Push every 6 hours PRN  pantoprazole  Injectable 40 milliGRAM(s) IV Push daily      PMHX/PSHX:  HTN (hypertension)    SBO (small bowel obstruction)    History of appendectomy        Family history:      Social History:     ROS:     General:  No wt loss, fevers, chills, night sweats, fatigue,   Eyes:  Good vision, no reported pain  ENT:  No sore throat, pain, runny nose, dysphagia  CV:  No pain, palpitations, hypo/hypertension  Resp:  No dyspnea, cough, tachypnea, wheezing  GI:  see HPI  :  No pain, bleeding, incontinence, nocturia  Muscle:  No pain, weakness  Neuro:  No weakness, tingling, memory problems  Psych:  No fatigue, insomnia, mood problems, depression  Endocrine:  No polyuria, polydipsia, cold/heat intolerance  Heme:  No petechiae, ecchymosis, easy bruisability  Skin:  No rash, tattoos, scars, edema      PHYSICAL EXAM:   Vital Signs:  Vital Signs Last 24 Hrs  T(C): 36.6 (21 Mar 2024 08:14), Max: 36.7 (20 Mar 2024 23:09)  T(F): 97.9 (21 Mar 2024 08:14), Max: 98.1 (20 Mar 2024 23:09)  HR: 80 (21 Mar 2024 08:14) (80 - 105)  BP: 128/79 (21 Mar 2024 08:14) (128/72 - 155/97)  BP(mean): --  RR: 16 (21 Mar 2024 08:14) (16 - 20)  SpO2: 93% (21 Mar 2024 08:14) (92% - 96%)    Parameters below as of 21 Mar 2024 08:14  Patient On (Oxygen Delivery Method): room air      Daily Height in cm: 170.18 (20 Mar 2024 14:38)    Daily     GENERAL:  Appears stated age, well-groomed, well-nourished, no distress  HEENT:  NC/AT,  conjunctivae clear and pink, no thyromegaly, nodules, adenopathy, no JVD, sclera -anicteric  CHEST:  Full & symmetric excursion, no increased effort, breath sounds clear  HEART:  Regular rhythm, S1, S2, no murmur/rub/S3/S4, no abdominal bruit, no edema  ABDOMEN:  Soft, non-tender, +distended, +umbilical hernia present, normoactive bowel sounds,  no masses ,no hepato-splenomegaly, no signs of chronic liver disease  EXTEREMITIES:  no cyanosis,clubbing or edema  SKIN:  No rash/erythema/ecchymoses/petechiae/wounds/abscess/warm/dry  NEURO:  Alert, oriented, no asterixis, no tremor, no encephalopathy    LABS:                        14.8   3.80  )-----------( 169      ( 21 Mar 2024 09:03 )             44.5     03-21    147<H>  |  113<H>  |  32<H>  ----------------------------<  86  4.2   |  26  |  1.24    Ca    10.0      21 Mar 2024 09:03  Mg     1.6     03-21    TPro  5.9<L>  /  Alb  2.9<L>  /  TBili  0.6  /  DBili  0.2  /  AST  31  /  ALT  80<H>  /  AlkPhos  58  03-21    LIVER FUNCTIONS - ( 21 Mar 2024 09:03 )  Alb: 2.9 g/dL / Pro: 5.9 g/dL / ALK PHOS: 58 U/L / ALT: 80 U/L / AST: 31 U/L / GGT: x           PT/INR - ( 20 Mar 2024 15:05 )   PT: 11.9 sec;   INR: 1.09 ratio         PTT - ( 20 Mar 2024 15:05 )  PTT:29.6 sec  Urinalysis Basic - ( 21 Mar 2024 09:03 )    Color: x / Appearance: x / SG: x / pH: x  Gluc: 86 mg/dL / Ketone: x  / Bili: x / Urobili: x   Blood: x / Protein: x / Nitrite: x   Leuk Esterase: x / RBC: x / WBC x   Sq Epi: x / Non Sq Epi: x / Bacteria: x      Amylase Serum--      Lipase serum16       Ammonia--      Imaging:

## 2024-03-21 NOTE — CARE COORDINATION ASSESSMENT. - NSCAREPROVIDERS_GEN_ALL_CORE_FT
CARE PROVIDERS:  Accepting Physician: Lizandro Burk  Admitting: Lizandro Burk  Attending: Lizandro Burk  Consultant: Bruno Sky  Consultant: Xiao Dash  Consultant: Viral Melgar  Consultant: Sonia Mueller  Consultant: Randy Cartagena  Consultant: Kyle Tan  Covering Team: Orlando Freedman  ED ACP: Jeny [facility]ows ED Attending: Layton Issa  ED Nurse: Mark Roque  Emergency Medicine: Layton Issa  Nurse: Martha Vick  Nurse: Vivian Zamora  Nurse: Wendy Velasquez  Nurse: Jake Hernadez  Override: Macy Bhatt  Override: Jake Hernadez  Override: Mark Roque  PCA/Nursing Assistant: Edel Leonard  PCA/Nursing Assistant: Boyd Gibbs  Primary Team: Iliana Cobb  Registered Dietitian: Andree Holguin  : Latoya Graham// Supp. Assoc.: Eva Quinn

## 2024-03-22 ENCOUNTER — INPATIENT (INPATIENT)
Facility: HOSPITAL | Age: 64
LOS: 2 days | Discharge: HOME | DRG: 390 | End: 2024-03-25
Attending: FAMILY MEDICINE | Admitting: FAMILY MEDICINE
Payer: COMMERCIAL

## 2024-03-22 VITALS
HEART RATE: 98 BPM | RESPIRATION RATE: 17 BRPM | HEIGHT: 67 IN | SYSTOLIC BLOOD PRESSURE: 174 MMHG | OXYGEN SATURATION: 95 % | DIASTOLIC BLOOD PRESSURE: 114 MMHG | TEMPERATURE: 99 F | WEIGHT: 171.96 LBS

## 2024-03-22 DIAGNOSIS — Z90.49 ACQUIRED ABSENCE OF OTHER SPECIFIED PARTS OF DIGESTIVE TRACT: Chronic | ICD-10-CM

## 2024-03-22 DIAGNOSIS — K56.609 UNSPECIFIED INTESTINAL OBSTRUCTION, UNSPECIFIED AS TO PARTIAL VERSUS COMPLETE OBSTRUCTION: ICD-10-CM

## 2024-03-22 LAB
ALBUMIN SERPL ELPH-MCNC: 3.4 G/DL — SIGNIFICANT CHANGE UP (ref 3.3–5)
ALP SERPL-CCNC: 82 U/L — SIGNIFICANT CHANGE UP (ref 30–120)
ALT FLD-CCNC: 134 U/L — HIGH (ref 10–60)
ANION GAP SERPL CALC-SCNC: 10 MMOL/L — SIGNIFICANT CHANGE UP (ref 5–17)
APPEARANCE UR: CLEAR — SIGNIFICANT CHANGE UP
APTT BLD: 29.5 SEC — SIGNIFICANT CHANGE UP (ref 24.5–35.6)
AST SERPL-CCNC: 63 U/L — HIGH (ref 10–40)
BASOPHILS # BLD AUTO: 0.04 K/UL — SIGNIFICANT CHANGE UP (ref 0–0.2)
BASOPHILS NFR BLD AUTO: 0.5 % — SIGNIFICANT CHANGE UP (ref 0–2)
BILIRUB SERPL-MCNC: 0.8 MG/DL — SIGNIFICANT CHANGE UP (ref 0.2–1.2)
BILIRUB UR-MCNC: NEGATIVE — SIGNIFICANT CHANGE UP
BLD GP AB SCN SERPL QL: SIGNIFICANT CHANGE UP
BUN SERPL-MCNC: 29 MG/DL — HIGH (ref 7–23)
CALCIUM SERPL-MCNC: 10.3 MG/DL — SIGNIFICANT CHANGE UP (ref 8.4–10.5)
CHLORIDE SERPL-SCNC: 105 MMOL/L — SIGNIFICANT CHANGE UP (ref 96–108)
CO2 SERPL-SCNC: 26 MMOL/L — SIGNIFICANT CHANGE UP (ref 22–31)
COLOR SPEC: YELLOW — SIGNIFICANT CHANGE UP
CREAT SERPL-MCNC: 1.16 MG/DL — SIGNIFICANT CHANGE UP (ref 0.5–1.3)
DIFF PNL FLD: NEGATIVE — SIGNIFICANT CHANGE UP
EGFR: 71 ML/MIN/1.73M2 — SIGNIFICANT CHANGE UP
EOSINOPHIL # BLD AUTO: 0.13 K/UL — SIGNIFICANT CHANGE UP (ref 0–0.5)
EOSINOPHIL NFR BLD AUTO: 1.8 % — SIGNIFICANT CHANGE UP (ref 0–6)
GLUCOSE SERPL-MCNC: 122 MG/DL — HIGH (ref 70–99)
GLUCOSE UR QL: NEGATIVE MG/DL — SIGNIFICANT CHANGE UP
HCT VFR BLD CALC: 48.3 % — SIGNIFICANT CHANGE UP (ref 39–50)
HGB BLD-MCNC: 16.5 G/DL — SIGNIFICANT CHANGE UP (ref 13–17)
IMM GRANULOCYTES NFR BLD AUTO: 0.3 % — SIGNIFICANT CHANGE UP (ref 0–0.9)
INR BLD: 1.08 RATIO — SIGNIFICANT CHANGE UP (ref 0.85–1.18)
KETONES UR-MCNC: ABNORMAL MG/DL
LEUKOCYTE ESTERASE UR-ACNC: NEGATIVE — SIGNIFICANT CHANGE UP
LIDOCAIN IGE QN: 29 U/L — SIGNIFICANT CHANGE UP (ref 16–77)
LYMPHOCYTES # BLD AUTO: 0.57 K/UL — LOW (ref 1–3.3)
LYMPHOCYTES # BLD AUTO: 7.8 % — LOW (ref 13–44)
MCHC RBC-ENTMCNC: 30.9 PG — SIGNIFICANT CHANGE UP (ref 27–34)
MCHC RBC-ENTMCNC: 34.2 GM/DL — SIGNIFICANT CHANGE UP (ref 32–36)
MCV RBC AUTO: 90.4 FL — SIGNIFICANT CHANGE UP (ref 80–100)
MONOCYTES # BLD AUTO: 0.79 K/UL — SIGNIFICANT CHANGE UP (ref 0–0.9)
MONOCYTES NFR BLD AUTO: 10.8 % — SIGNIFICANT CHANGE UP (ref 2–14)
NEUTROPHILS # BLD AUTO: 5.77 K/UL — SIGNIFICANT CHANGE UP (ref 1.8–7.4)
NEUTROPHILS NFR BLD AUTO: 78.8 % — HIGH (ref 43–77)
NITRITE UR-MCNC: NEGATIVE — SIGNIFICANT CHANGE UP
NRBC # BLD: 0 /100 WBCS — SIGNIFICANT CHANGE UP (ref 0–0)
PH UR: 5.5 — SIGNIFICANT CHANGE UP (ref 5–8)
PLATELET # BLD AUTO: 210 K/UL — SIGNIFICANT CHANGE UP (ref 150–400)
POTASSIUM SERPL-MCNC: 3.9 MMOL/L — SIGNIFICANT CHANGE UP (ref 3.5–5.3)
POTASSIUM SERPL-SCNC: 3.9 MMOL/L — SIGNIFICANT CHANGE UP (ref 3.5–5.3)
PROT SERPL-MCNC: 6.9 G/DL — SIGNIFICANT CHANGE UP (ref 6–8.3)
PROT UR-MCNC: NEGATIVE MG/DL — SIGNIFICANT CHANGE UP
PROTHROM AB SERPL-ACNC: 12.1 SEC — SIGNIFICANT CHANGE UP (ref 9.5–13)
RBC # BLD: 5.34 M/UL — SIGNIFICANT CHANGE UP (ref 4.2–5.8)
RBC # FLD: 12 % — SIGNIFICANT CHANGE UP (ref 10.3–14.5)
SODIUM SERPL-SCNC: 141 MMOL/L — SIGNIFICANT CHANGE UP (ref 135–145)
SP GR SPEC: 1.02 — SIGNIFICANT CHANGE UP (ref 1–1.03)
UROBILINOGEN FLD QL: 1 MG/DL — SIGNIFICANT CHANGE UP (ref 0.2–1)
WBC # BLD: 7.32 K/UL — SIGNIFICANT CHANGE UP (ref 3.8–10.5)
WBC # FLD AUTO: 7.32 K/UL — SIGNIFICANT CHANGE UP (ref 3.8–10.5)

## 2024-03-22 PROCEDURE — 74019 RADEX ABDOMEN 2 VIEWS: CPT | Mod: 26

## 2024-03-22 PROCEDURE — 93010 ELECTROCARDIOGRAM REPORT: CPT

## 2024-03-22 PROCEDURE — 99222 1ST HOSP IP/OBS MODERATE 55: CPT

## 2024-03-22 PROCEDURE — 99285 EMERGENCY DEPT VISIT HI MDM: CPT

## 2024-03-22 PROCEDURE — 74176 CT ABD & PELVIS W/O CONTRAST: CPT | Mod: 26,MC

## 2024-03-22 RX ORDER — PANTOPRAZOLE SODIUM 20 MG/1
40 TABLET, DELAYED RELEASE ORAL EVERY 24 HOURS
Refills: 0 | Status: DISCONTINUED | OUTPATIENT
Start: 2024-03-22 | End: 2024-03-25

## 2024-03-22 RX ORDER — ACETAMINOPHEN 500 MG
650 TABLET ORAL EVERY 6 HOURS
Refills: 0 | Status: DISCONTINUED | OUTPATIENT
Start: 2024-03-22 | End: 2024-03-25

## 2024-03-22 RX ORDER — SODIUM CHLORIDE 9 MG/ML
1000 INJECTION INTRAMUSCULAR; INTRAVENOUS; SUBCUTANEOUS ONCE
Refills: 0 | Status: COMPLETED | OUTPATIENT
Start: 2024-03-22 | End: 2024-03-22

## 2024-03-22 RX ORDER — MORPHINE SULFATE 50 MG/1
4 CAPSULE, EXTENDED RELEASE ORAL ONCE
Refills: 0 | Status: DISCONTINUED | OUTPATIENT
Start: 2024-03-22 | End: 2024-03-22

## 2024-03-22 RX ORDER — HEPARIN SODIUM 5000 [USP'U]/ML
5000 INJECTION INTRAVENOUS; SUBCUTANEOUS EVERY 12 HOURS
Refills: 0 | Status: DISCONTINUED | OUTPATIENT
Start: 2024-03-22 | End: 2024-03-25

## 2024-03-22 RX ORDER — ONDANSETRON 8 MG/1
4 TABLET, FILM COATED ORAL ONCE
Refills: 0 | Status: COMPLETED | OUTPATIENT
Start: 2024-03-22 | End: 2024-03-22

## 2024-03-22 RX ORDER — SODIUM CHLORIDE 9 MG/ML
1000 INJECTION, SOLUTION INTRAVENOUS
Refills: 0 | Status: DISCONTINUED | OUTPATIENT
Start: 2024-03-22 | End: 2024-03-25

## 2024-03-22 RX ADMIN — ONDANSETRON 4 MILLIGRAM(S): 8 TABLET, FILM COATED ORAL at 23:56

## 2024-03-22 RX ADMIN — SODIUM CHLORIDE 100 MILLILITER(S): 9 INJECTION, SOLUTION INTRAVENOUS at 20:20

## 2024-03-22 RX ADMIN — MORPHINE SULFATE 4 MILLIGRAM(S): 50 CAPSULE, EXTENDED RELEASE ORAL at 19:30

## 2024-03-22 RX ADMIN — SODIUM CHLORIDE 1000 MILLILITER(S): 9 INJECTION INTRAMUSCULAR; INTRAVENOUS; SUBCUTANEOUS at 19:00

## 2024-03-22 RX ADMIN — MORPHINE SULFATE 4 MILLIGRAM(S): 50 CAPSULE, EXTENDED RELEASE ORAL at 23:56

## 2024-03-22 RX ADMIN — SODIUM CHLORIDE 1000 MILLILITER(S): 9 INJECTION INTRAMUSCULAR; INTRAVENOUS; SUBCUTANEOUS at 17:45

## 2024-03-22 RX ADMIN — HEPARIN SODIUM 5000 UNIT(S): 5000 INJECTION INTRAVENOUS; SUBCUTANEOUS at 20:23

## 2024-03-22 RX ADMIN — PANTOPRAZOLE SODIUM 40 MILLIGRAM(S): 20 TABLET, DELAYED RELEASE ORAL at 20:21

## 2024-03-22 RX ADMIN — ONDANSETRON 4 MILLIGRAM(S): 8 TABLET, FILM COATED ORAL at 17:46

## 2024-03-22 RX ADMIN — MORPHINE SULFATE 4 MILLIGRAM(S): 50 CAPSULE, EXTENDED RELEASE ORAL at 17:46

## 2024-03-22 NOTE — PATIENT PROFILE ADULT - NSPROGENPREVTRANSF_GEN_A_NUR
Last visit- 4/3/2019  Next visit- Visit date not found    Requested Prescriptions     Pending Prescriptions Disp Refills    ALPRAZolam (XANAX) 0.25 MG tablet 30 tablet 0     Sig: Take 1 tablet by mouth 3 times daily as needed for Anxiety for up to 180 days. no history of blood product transfusion

## 2024-03-22 NOTE — ED PROVIDER NOTE - CLINICAL SUMMARY MEDICAL DECISION MAKING FREE TEXT BOX
64-year-old male with history of hypertension appendectomy as a child was admitted to the hospital 2 days ago for SBO thought to be due to adhesions.  Felt better after IV fluids and bowel rest and was discharged home yesterday and told to follow-up with surgery if symptoms recurred.  Last night developed increasing nausea vomiting and distention.  Complaining of pain in the left lower abdomen.  States he had loose bowel movement last night.  Denies fever hematemesis melena dysuria hematuria or other symptom.    Physical exam reveals distention and tenderness consistent with SBO.  Plan is labs x-rays IV fluids pain meds and surgery consult.

## 2024-03-22 NOTE — H&P ADULT - NSHPADDITIONALINFOADULT_GEN_ALL_CORE
Heparin   Injectable 5000 Unit(s) SubCutaneous every 12 hours  acetaminophen  Suppository .. 650 milliGRAM(s) Rectal every 6 hours PRN Mild Pain (1 - 3)

## 2024-03-22 NOTE — H&P ADULT - ASSESSMENT
Neymar Pryor is a 64-year-old male with history of hypertension appendectomy as a child was admitted to the hospital 2 days ago for SBO thought to be due to adhesions.  Felt better after IV fluids and bowel rest and was discharged home yesterday and told to follow-up with surgery if symptoms recurred.  Last night developed increasing nausea vomiting and distention.  Complaining of pain in the left lower abdomen.  States he had loose bowel movement last night.  Denies fever hematemesis melena dysuria hematuria or other symptom.   Neymar Pryor is a 64-year-old male admitted to the hospital 2 days ago for SBO thought to be due to adhesions.  Felt better after IV fluids and bowel rest and was discharged home yesterday and told to follow-up with surgery if symptoms recurred.  Last night developed increasing nausea vomiting and distention.  Complaining of pain in the left lower abdomen.  States he had loose bowel movement last night.

## 2024-03-22 NOTE — PATIENT PROFILE ADULT - FALL HARM RISK - UNIVERSAL INTERVENTIONS
Bed in lowest position, wheels locked, appropriate side rails in place/Call bell, personal items and telephone in reach/Instruct patient to call for assistance before getting out of bed or chair/Non-slip footwear when patient is out of bed/[place] to call system/Physically safe environment - no spills, clutter or unnecessary equipment/Purposeful Proactive Rounding/Room/bathroom lighting operational, light cord in reach

## 2024-03-22 NOTE — ED ADULT TRIAGE NOTE - CHIEF COMPLAINT QUOTE
65 y/o male presents axo4 ambulatory c/o left sided abd pain, nausea and vomiting since last night. pt was discharged yesterday from inpatient for SBO.

## 2024-03-22 NOTE — ED ADULT NURSE NOTE - OBJECTIVE STATEMENT
Pt A&OX3, amb ad shelli, c/o LLQ abd pain and vomiting this AM.  Pt was sent home from hospital 2 days ago after SBO diagnosis.  Pt 's last stool was today at 9am.  Abd is soft, mildly distended, tender, to touch.  Pt moving all ext well.  NO fevers.  VSS

## 2024-03-22 NOTE — H&P ADULT - NSHPLABSRESULTS_GEN_ALL_CORE
16.5   7.32  )-----------( 210      ( 22 Mar 2024 17:48 )             48.3     22 Mar 2024 17:48    141    |  105    |  29     ----------------------------<  122    3.9     |  26     |  1.16     Ca    10.3       22 Mar 2024 17:48  Mg     1.6       21 Mar 2024 09:03    TPro  6.9    /  Alb  3.4    /  TBili  0.8    /  DBili  x      /  AST  63     /  ALT  134    /  AlkPhos  82     22 Mar 2024 17:48    LIVER FUNCTIONS - ( 22 Mar 2024 17:48 )  Alb: 3.4 g/dL / Pro: 6.9 g/dL / ALK PHOS: 82 U/L / ALT: 134 U/L / AST: 63 U/L / GGT: x           PT/INR - ( 22 Mar 2024 17:48 )   PT: 12.1 sec;   INR: 1.08 ratio      PTT - ( 22 Mar 2024 17:48 )  PTT:29.5 sec  CAPILLARY BLOOD GLUCOSE    Urinalysis Basic - ( 22 Mar 2024 19:30 )    Color: Yellow / Appearance: Clear / S.019 / pH: x  Gluc: x / Ketone: Trace mg/dL  / Bili: Negative / Urobili: 1.0 mg/dL   Blood: x / Protein: Negative mg/dL / Nitrite: Negative   Leuk Esterase: Negative / RBC: x / WBC x   Sq Epi: x / Non Sq Epi: x / Bacteria: x    < from: CT Abdomen and Pelvis No Cont (24 @ 17:57) >    IMPRESSION:  Recurrent small bowel obstruction.  Additional findings as discussed    < end of copied text >

## 2024-03-22 NOTE — H&P ADULT - CARDIOVASCULAR
normal/regular rate and rhythm/S1 S2 present/no gallops/no rub/no murmur/Irregularly irregular rhythm details…

## 2024-03-22 NOTE — ED PROVIDER NOTE - OBJECTIVE STATEMENT
64-year-old male with history of hypertension appendectomy as a child was admitted to the hospital 2 days ago for SBO thought to be due to adhesions.  Felt better after IV fluids and bowel rest and was discharged home yesterday and told to follow-up with surgery if symptoms recurred.  Last night developed increasing nausea vomiting and distention.  Complaining of pain in the left lower abdomen.  States he had loose bowel movement last night.  Denies fever hematemesis melena dysuria hematuria or other symptom.

## 2024-03-22 NOTE — ED ADULT NURSE NOTE - NSFALLUNIVINTERV_ED_ALL_ED
Bed/Stretcher in lowest position, wheels locked, appropriate side rails in place/Call bell, personal items and telephone in reach/Instruct patient to call for assistance before getting out of bed/chair/stretcher/Non-slip footwear applied when patient is off stretcher/[place] to call system/Physically safe environment - no spills, clutter or unnecessary equipment/Purposeful proactive rounding/Room/bathroom lighting operational, light cord in reach

## 2024-03-22 NOTE — CONSULT NOTE ADULT - TIME BILLING
Reviewed with patient in detail, wife at bedside, ER attending, Hospitalist, prior Surgical attending as well as RN team.

## 2024-03-22 NOTE — ED ADULT TRIAGE NOTE - HOW PATIENT ADDRESSED, PROFILE
Procedure: Right shoulder arthroscopic rotator cuff repair and biceps tenodesis  Date of procedure: 1/5/21    HPI:  Bennie Montemayor is a 47 y.o. female who is approximately 6 weeks status post aforementioned procedure. She indicates that she continues to do well having no pain in her shoulder. She states that her shoulder feels different in a good way compared to what it felt like after her previous surgery. She is remained compliant with her immobilization and pendulum exercises. Physical examination:  Evaluation of patient's right shoulder and upper extremity demonstrates her incisions to be healed. There is no warmth, erythema, wound dehiscence or drainage. Sensation is grossly intact light touch in all dermatomes and she has a 2+ radial pulse with brisk capillary refill in her fingers. I can actively elevate and abduct her shoulder to approximately 90 degrees. Impression and plan:  Bennie Montemayor is a 47 y.o. female who is approximately 6 weeks status post an arthroscopic right shoulder rotator cuff repair and biceps tenodesis. She continues to do well at this time and she is optimistic that she will have a better result this time around compared to her previous experience. At this point I am going to get her started in formal physical therapy working to restore range of motion and gradually work on progressive strengthening as her motion returns to normal.  A prescription for physical therapy was provided to initiate rehabilitation using guidelines for repair of a large rotator cuff tear. She may start using her arm for light activities limiting any lifting pushing or pulling to 1 to 2 pounds. She is to discontinue use of her sling as well. I will see her back for reevaluation in 6 weeks but she was encouraged to return or call earlier with any questions and/or concerns.
Kendall

## 2024-03-22 NOTE — ED ADULT NURSE NOTE - CHIEF COMPLAINT QUOTE
65 y/o male presents axo4 ambulatory c/o left sided abd pain, nausea and vomiting since last night. pt was discharged yesterday from inpatient for SBO.
[id number]

## 2024-03-22 NOTE — H&P ADULT - PROBLEM SELECTOR PLAN 1
NPO  lactated ringers. 1000 milliLiter(s) (100 mL/Hr) IV Continuous <Continuous>  pantoprazole  Injectable 40 milliGRAM(s) IV Push every 24 hours

## 2024-03-22 NOTE — PATIENT PROFILE ADULT - FALL HARM RISK - FACTORS NURSING JUDGEMENT
The patient is a 46y Male complaining of No The patient is a 46y Male complaining of weakness and numbness

## 2024-03-22 NOTE — CONSULT NOTE ADULT - SUBJECTIVE AND OBJECTIVE BOX
Mr. Pryor is a 64y old male with distant history of open appendectomy, prior conservatively managed SBO and now representing 24 hours after discharge from most recent admission for SBO.    He reports bloating, distension and abdominal discomfort, but not jean pierre pain.  After eating a solid meal yesterday and then in pm, he became uncomfortable and thus returned to ER.    Last solid BM yesterday with flatus today.      PAST MEDICAL & SURGICAL HISTORY:  HTN (hypertension)  SBO (small bowel obstruction)  History of appendectomy      Review of Systems:  As per HPI only, otherwise negative.       MEDICATIONS  (STANDING): Lisinopril      Allergies  No Known Allergies    Intolerances        SOCIAL HISTORY        Denies tobacco use, EtOH abuse or illicit drug use.      FAMILY HISTORY:       Non contributory    Vital Signs Last 24 Hrs  Vitals reviewed as stable without fever, hypotension or tachycardia in ER      Physical Exam:    General:  Appears well-groomed, well-nourished, and somewhat anxious but no acute distress  Eyes : PERRL  HEENT: Moist mucosal membranes  Chest:  equal expansion bilaterally   Cardiovascular:  Regular rate & rhythm  Abdomen:  distended , mildly tympanic, but soft without any guarding/ rebound or point tenderness.  Large, soft, non tender ventral hernia at umbilicus  Extremities:  no edema , good capillary refill   Skin:  No lesions  Musculoskeletal:  grossly symmetric   Neuro/Psych:  Alert, oriented to time, place and person   Anxious/ frustrated, but appropriately so    CBC negative for leukocytosis  Chemistry negative for acidosis    CT:  Moderately dilated fluid-filled stomach. Moderately to markedly   dilated fluid and feces filled the proximal and mid small bowel with   decompressed distal small bowel loops similar in appearance to prior   study consistent with recurrent small bowel obstruction. No obstructing   mass. Colonic diverticulosis without acute diverticulitis Appendix no   evidence for appendicitis  PERITONEUM: No ascites.  VESSELS: Within normal limits.  RETROPERITONEUM/LYMPH NODES: No lymphadenopathy.  ABDOMINAL WALL: Fat-containing umbilical hernia devoid of bowel. Mild   widening right inguinal ring with fat.  BONES: Degenerative changes.    IMPRESSION:  Recurrent small bowel obstruction.

## 2024-03-22 NOTE — H&P ADULT - HISTORY OF PRESENT ILLNESS
Chart, labs and reports reviewed.   Chart, labs and reports reviewed.  Neymar Pryor is a 64-year-old male with history of hypertension appendectomy as a child was admitted to the hospital 2 days ago for SBO thought to be due to adhesions.  Felt better after IV fluids and bowel rest and was discharged home yesterday and he was told to follow-up with surgery if symptoms recurred.  Last night he developed increasing nausea vomiting and distention.  Complaining of pain in the left lower abdomen.  States he had loose bowel movement last night.  Denies fever, hematemesis, melena, dysuria, hematuria or other symptom.

## 2024-03-22 NOTE — CONSULT NOTE ADULT - ASSESSMENT
Persistent +/- recurrent SBO.  Clinically, requires admission due to same.  Surgically, no immediate indication for intervention.  -vitals reassuring  -exam benign  -labs non suggestive  -CT c/w adhesive disease  NG attempt personally attempted in ER with patient poorly tolerating and refusing further efforts.  Therefore:  -Admit, limit PO, ongoing IVF, serial exam, follow-up labs, interval plain films tomorrow with Gastrograffin trial Sunday  Patient and wife aware that if condition deteriorates, then operative intervention or more vehement NG insertion efforts may be mandated.

## 2024-03-23 DIAGNOSIS — K56.609 UNSPECIFIED INTESTINAL OBSTRUCTION, UNSPECIFIED AS TO PARTIAL VERSUS COMPLETE OBSTRUCTION: ICD-10-CM

## 2024-03-23 DIAGNOSIS — R11.2 NAUSEA WITH VOMITING, UNSPECIFIED: ICD-10-CM

## 2024-03-23 DIAGNOSIS — I10 ESSENTIAL (PRIMARY) HYPERTENSION: ICD-10-CM

## 2024-03-23 LAB
A1C WITH ESTIMATED AVERAGE GLUCOSE RESULT: 5.4 % — SIGNIFICANT CHANGE UP (ref 4–5.6)
ANION GAP SERPL CALC-SCNC: 7 MMOL/L — SIGNIFICANT CHANGE UP (ref 5–17)
BASOPHILS # BLD AUTO: 0.05 K/UL — SIGNIFICANT CHANGE UP (ref 0–0.2)
BASOPHILS NFR BLD AUTO: 0.9 % — SIGNIFICANT CHANGE UP (ref 0–2)
BUN SERPL-MCNC: 27 MG/DL — HIGH (ref 7–23)
CALCIUM SERPL-MCNC: 9.2 MG/DL — SIGNIFICANT CHANGE UP (ref 8.4–10.5)
CHLORIDE SERPL-SCNC: 111 MMOL/L — HIGH (ref 96–108)
CO2 SERPL-SCNC: 27 MMOL/L — SIGNIFICANT CHANGE UP (ref 22–31)
CREAT SERPL-MCNC: 1.03 MG/DL — SIGNIFICANT CHANGE UP (ref 0.5–1.3)
EGFR: 82 ML/MIN/1.73M2 — SIGNIFICANT CHANGE UP
EOSINOPHIL # BLD AUTO: 0.22 K/UL — SIGNIFICANT CHANGE UP (ref 0–0.5)
EOSINOPHIL NFR BLD AUTO: 4 % — SIGNIFICANT CHANGE UP (ref 0–6)
ESTIMATED AVERAGE GLUCOSE: 108 MG/DL — SIGNIFICANT CHANGE UP (ref 68–114)
GLUCOSE SERPL-MCNC: 97 MG/DL — SIGNIFICANT CHANGE UP (ref 70–99)
HCT VFR BLD CALC: 42.7 % — SIGNIFICANT CHANGE UP (ref 39–50)
HGB BLD-MCNC: 14.2 G/DL — SIGNIFICANT CHANGE UP (ref 13–17)
IMM GRANULOCYTES NFR BLD AUTO: 0.4 % — SIGNIFICANT CHANGE UP (ref 0–0.9)
LYMPHOCYTES # BLD AUTO: 0.88 K/UL — LOW (ref 1–3.3)
LYMPHOCYTES # BLD AUTO: 16.1 % — SIGNIFICANT CHANGE UP (ref 13–44)
MCHC RBC-ENTMCNC: 30.7 PG — SIGNIFICANT CHANGE UP (ref 27–34)
MCHC RBC-ENTMCNC: 33.3 GM/DL — SIGNIFICANT CHANGE UP (ref 32–36)
MCV RBC AUTO: 92.4 FL — SIGNIFICANT CHANGE UP (ref 80–100)
MONOCYTES # BLD AUTO: 0.63 K/UL — SIGNIFICANT CHANGE UP (ref 0–0.9)
MONOCYTES NFR BLD AUTO: 11.6 % — SIGNIFICANT CHANGE UP (ref 2–14)
NEUTROPHILS # BLD AUTO: 3.65 K/UL — SIGNIFICANT CHANGE UP (ref 1.8–7.4)
NEUTROPHILS NFR BLD AUTO: 67 % — SIGNIFICANT CHANGE UP (ref 43–77)
NRBC # BLD: 0 /100 WBCS — SIGNIFICANT CHANGE UP (ref 0–0)
PLATELET # BLD AUTO: 164 K/UL — SIGNIFICANT CHANGE UP (ref 150–400)
POTASSIUM SERPL-MCNC: 3.9 MMOL/L — SIGNIFICANT CHANGE UP (ref 3.5–5.3)
POTASSIUM SERPL-SCNC: 3.9 MMOL/L — SIGNIFICANT CHANGE UP (ref 3.5–5.3)
RBC # BLD: 4.62 M/UL — SIGNIFICANT CHANGE UP (ref 4.2–5.8)
RBC # FLD: 12.1 % — SIGNIFICANT CHANGE UP (ref 10.3–14.5)
SODIUM SERPL-SCNC: 145 MMOL/L — SIGNIFICANT CHANGE UP (ref 135–145)
WBC # BLD: 5.45 K/UL — SIGNIFICANT CHANGE UP (ref 3.8–10.5)
WBC # FLD AUTO: 5.45 K/UL — SIGNIFICANT CHANGE UP (ref 3.8–10.5)

## 2024-03-23 PROCEDURE — 99233 SBSQ HOSP IP/OBS HIGH 50: CPT

## 2024-03-23 PROCEDURE — 74019 RADEX ABDOMEN 2 VIEWS: CPT | Mod: 26

## 2024-03-23 RX ADMIN — MORPHINE SULFATE 4 MILLIGRAM(S): 50 CAPSULE, EXTENDED RELEASE ORAL at 01:38

## 2024-03-23 RX ADMIN — PANTOPRAZOLE SODIUM 40 MILLIGRAM(S): 20 TABLET, DELAYED RELEASE ORAL at 20:58

## 2024-03-23 RX ADMIN — SODIUM CHLORIDE 100 MILLILITER(S): 9 INJECTION, SOLUTION INTRAVENOUS at 06:15

## 2024-03-23 RX ADMIN — HEPARIN SODIUM 5000 UNIT(S): 5000 INJECTION INTRAVENOUS; SUBCUTANEOUS at 06:15

## 2024-03-23 RX ADMIN — Medication 1.25 MILLIGRAM(S): at 17:36

## 2024-03-23 RX ADMIN — Medication 1.25 MILLIGRAM(S): at 06:15

## 2024-03-23 RX ADMIN — Medication 1.25 MILLIGRAM(S): at 11:53

## 2024-03-23 RX ADMIN — Medication 1.25 MILLIGRAM(S): at 23:01

## 2024-03-23 RX ADMIN — HEPARIN SODIUM 5000 UNIT(S): 5000 INJECTION INTRAVENOUS; SUBCUTANEOUS at 17:36

## 2024-03-23 RX ADMIN — Medication 1.25 MILLIGRAM(S): at 00:15

## 2024-03-23 NOTE — DIETITIAN INITIAL EVALUATION ADULT - PERTINENT LABORATORY DATA
03-23    145  |  111<H>  |  27<H>  ----------------------------<  97  3.9   |  27  |  1.03    Ca    9.2      23 Mar 2024 06:17    TPro  6.9  /  Alb  3.4  /  TBili  0.8  /  DBili  x   /  AST  63<H>  /  ALT  134<H>  /  AlkPhos  82  03-22  A1C with Estimated Average Glucose Result: 5.4 % (03-22-24 @ 20:00)

## 2024-03-23 NOTE — PROVIDER CONTACT NOTE (OTHER) - BACKGROUND
The patient is here for a small bowel obstruction. He is having bowel movements and denies abdominal pain at this time. The patient is tolerating water and ice chips well.

## 2024-03-23 NOTE — DIETITIAN INITIAL EVALUATION ADULT - PERTINENT MEDS FT
MEDICATIONS  (STANDING):  enalaprilat Injectable 1.25 milliGRAM(s) IV Push every 6 hours  heparin   Injectable 5000 Unit(s) SubCutaneous every 12 hours  lactated ringers. 1000 milliLiter(s) (100 mL/Hr) IV Continuous <Continuous>  pantoprazole  Injectable 40 milliGRAM(s) IV Push every 24 hours    MEDICATIONS  (PRN):  acetaminophen  Suppository .. 650 milliGRAM(s) Rectal every 6 hours PRN Mild Pain (1 - 3)

## 2024-03-23 NOTE — CARE COORDINATION ASSESSMENT. - NSCAREPROVIDERS_GEN_ALL_CORE_FT
CARE PROVIDERS:  Accepting Physician: Lizandro Burk  Admitting: Lizandro Burk  Attending: Lizandro Burk  Case Management: Ericka Brooks  Consultant: Randy Cartagena  Covering Team: Magalie Holland  Covering Team: Viral Melgar  Covering Team: Jc Saldana  Covering Team: Juanis Thorpe  ED Attending: Khadar Preciado  ED Nurse: Jessica Ayala  Nurse: Ivet Redding  Outpatient Provider: Matthew Banks  Override: Bartolome Candelario  PCA/Nursing Assistant: Rachelle Frank  Registered Dietitian: Ammy Tavera  : Tracy Almeida

## 2024-03-23 NOTE — DIETITIAN INITIAL EVALUATION ADULT - OTHER INFO
Patient seen for nutrition assessment. He denies food allergies, denies abdominal pain at this time. + flatus and stool. Per surgery notes, patient may be able to start clear liquids later today. Patient reports he is hungry, tolerating ice chips and hard candies at this time. Reports his UBW to be 172, with no known weight changes.

## 2024-03-23 NOTE — DIETITIAN INITIAL EVALUATION ADULT - NS FNS DIET ORDER
Diet, NPO:   Except Medications  With Chewing Gum  With Hard Candy  With Ice Chips/Sips of Water (03-22-24 @ 20:31)

## 2024-03-23 NOTE — PROGRESS NOTE ADULT - SUBJECTIVE AND OBJECTIVE BOX
Date of Service 03-23-24 @ 20:22    Patient is a 64y old  Male who presents with a chief complaint of Intestinal obstruction  per H&P: Reason for Admission: Abdominal pain, distention, nausea and vomiting.  History of Present Illness:   Chart, labs and reports reviewed.  Neymar Pryor is a 64-year-old male with history of hypertension appendectomy as a child was admitted to the hospital 2 days ago for SBO thought to be due to adhesions.  Felt better after IV fluids and bowel rest and was discharged home yesterday and he was told to follow-up with surgery if symptoms recurred.  Last night he developed increasing nausea vomiting and distention.  Complaining of pain in the left lower abdomen.  States he had loose bowel movement last night.  Denies fever, hematemesis, melena, dysuria, hematuria or other symptom     (23 Mar 2024 12:05)      INTERVAL /OVERNIGHT EVENTS: + BM    MEDICATIONS  (STANDING):  enalaprilat Injectable 1.25 milliGRAM(s) IV Push every 6 hours  heparin   Injectable 5000 Unit(s) SubCutaneous every 12 hours  lactated ringers. 1000 milliLiter(s) (100 mL/Hr) IV Continuous <Continuous>  pantoprazole  Injectable 40 milliGRAM(s) IV Push every 24 hours    MEDICATIONS  (PRN):  acetaminophen  Suppository .. 650 milliGRAM(s) Rectal every 6 hours PRN Mild Pain (1 - 3)      Allergies    No Known Allergies    Intolerances        REVIEW OF SYSTEMS:  CONSTITUTIONAL: No fever, weight loss, or fatigue  EYES: No eye pain, visual disturbances, or discharge  ENMT:  No difficulty hearing, tinnitus, vertigo; No sinus or throat pain  NECK: No pain or stiffness  RESPIRATORY: No cough, wheezing, chills or hemoptysis; No shortness of breath  CARDIOVASCULAR: No chest pain, palpitations, dizziness, or leg swelling  GASTROINTESTINAL: No abdominal or epigastric pain. No nausea, vomiting, or hematemesis; No diarrhea or constipation. No melena or hematochezia.  GENITOURINARY: No dysuria, frequency, hematuria, or incontinence  NEUROLOGICAL: No headaches, memory loss, loss of strength, numbness, or tremors  SKIN: No itching, burning, rashes, or lesions   LYMPH NODES: No enlarged glands  ENDOCRINE: No heat or cold intolerance; No hair loss; No polydipsia or polyuria  MUSCULOSKELETAL: No joint pain or swelling; No muscle, back, or extremity pain  PSYCHIATRIC: No depression, anxiety, mood swings, or difficulty sleeping  HEME/LYMPH: No easy bruising, or bleeding gums  ALLERGY AND IMMUNOLOGIC: No hives or eczema    Vital Signs Last 24 Hrs  T(C): 36.7 (23 Mar 2024 15:33), Max: 36.8 (23 Mar 2024 03:13)  T(F): 98.1 (23 Mar 2024 15:33), Max: 98.2 (23 Mar 2024 03:13)  HR: 80 (23 Mar 2024 17:30) (59 - 93)  BP: 158/91 (23 Mar 2024 17:30) (106/57 - 158/91)  BP(mean): 114 (23 Mar 2024 17:30) (107 - 114)  RR: 17 (23 Mar 2024 15:33) (16 - 18)  SpO2: 92% (23 Mar 2024 15:33) (92% - 99%)    Parameters below as of 23 Mar 2024 15:33  Patient On (Oxygen Delivery Method): room air        PHYSICAL EXAM:  GENERAL: NAD, well-groomed, well-developed  HEAD:  Atraumatic, Normocephalic  EYES: EOMI, PERRLA, conjunctiva and sclera clear  ENMT: No tonsillar erythema, exudates, or enlargement; Moist mucous membranes, Good dentition, No lesions  NECK: Supple, No JVD, Normal thyroid  NERVOUS SYSTEM:  Alert & Oriented X3, Good concentration; Motor Strength 5/5 B/L upper and lower extremities; DTRs 2+ intact and symmetric  CHEST/LUNG: Clear to auscultation bilaterally; No rales, rhonchi, wheezing, or rubs  HEART: Regular rate and rhythm; No murmurs, rubs, or gallops  ABDOMEN: Soft, Nontender, +distended; Bowel sounds present  EXTREMITIES:  2+ Peripheral Pulses, No clubbing, cyanosis, or edema  LYMPH: No lymphadenopathy noted  SKIN: No rashes or lesions    LABS:                        14.2   5.45  )-----------( 164      ( 23 Mar 2024 06:17 )             42.7     23 Mar 2024 06:17    145    |  111    |  27     ----------------------------<  97     3.9     |  27     |  1.03     Ca    9.2        23 Mar 2024 06:17      PT/INR - ( 22 Mar 2024 17:48 )   PT: 12.1 sec;   INR: 1.08 ratio         PTT - ( 22 Mar 2024 17:48 )  PTT:29.5 sec  Urinalysis Basic - ( 23 Mar 2024 06:17 )    Color: x / Appearance: x / SG: x / pH: x  Gluc: 97 mg/dL / Ketone: x  / Bili: x / Urobili: x   Blood: x / Protein: x / Nitrite: x   Leuk Esterase: x / RBC: x / WBC x   Sq Epi: x / Non Sq Epi: x / Bacteria: x      CAPILLARY BLOOD GLUCOSE          RADIOLOGY & ADDITIONAL TESTS:    Notes Reviewed:  [x ] YES  [ ] NO    Care Discussed with Consultants/Other Providers [x ] YES  [ ] NO

## 2024-03-23 NOTE — DIETITIAN INITIAL EVALUATION ADULT - REASON FOR ADMISSION
Intestinal obstruction  per H&P: Reason for Admission: Abdominal pain, distention, nausea and vomiting.  History of Present Illness:   Chart, labs and reports reviewed.  Neymar Pryor is a 64-year-old male with history of hypertension appendectomy as a child was admitted to the hospital 2 days ago for SBO thought to be due to adhesions.  Felt better after IV fluids and bowel rest and was discharged home yesterday and he was told to follow-up with surgery if symptoms recurred.  Last night he developed increasing nausea vomiting and distention.  Complaining of pain in the left lower abdomen.  States he had loose bowel movement last night.  Denies fever, hematemesis, melena, dysuria, hematuria or other symptom

## 2024-03-23 NOTE — CARE COORDINATION ASSESSMENT. - NSDCPLANSERVICES_GEN_ALL_CORE
This CM met at the bedside with the pt. Introduced myself as the CM explained my role and left contact information. The pt verbalized understanding. 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 The pt is independent with ADL's and ambulating without any devices. No HHA or services in the home. The pt will be for home with no skilled needs once cleared by the surgical team. The pt's wife will transport him home once medically cleared./No Anticipated Discharge Needs

## 2024-03-23 NOTE — DIETITIAN INITIAL EVALUATION ADULT - ORAL INTAKE PTA/DIET HISTORY
patient reports generally with good appetite and good PO intake, consuming 3 meals/day, no diet restrictions reported

## 2024-03-24 PROCEDURE — 99232 SBSQ HOSP IP/OBS MODERATE 35: CPT

## 2024-03-24 PROCEDURE — 74018 RADEX ABDOMEN 1 VIEW: CPT | Mod: 26,76

## 2024-03-24 RX ORDER — DIATRIZOATE MEGLUMINE 180 MG/ML
100 INJECTION, SOLUTION INTRAVESICAL ONCE
Refills: 0 | Status: COMPLETED | OUTPATIENT
Start: 2024-03-24 | End: 2024-03-24

## 2024-03-24 RX ADMIN — Medication 1.25 MILLIGRAM(S): at 17:37

## 2024-03-24 RX ADMIN — Medication 1.25 MILLIGRAM(S): at 11:14

## 2024-03-24 RX ADMIN — Medication 1.25 MILLIGRAM(S): at 23:36

## 2024-03-24 RX ADMIN — PANTOPRAZOLE SODIUM 40 MILLIGRAM(S): 20 TABLET, DELAYED RELEASE ORAL at 21:34

## 2024-03-24 RX ADMIN — HEPARIN SODIUM 5000 UNIT(S): 5000 INJECTION INTRAVENOUS; SUBCUTANEOUS at 05:59

## 2024-03-24 RX ADMIN — DIATRIZOATE MEGLUMINE 100 MILLILITER(S): 180 INJECTION, SOLUTION INTRAVESICAL at 15:09

## 2024-03-24 RX ADMIN — Medication 1.25 MILLIGRAM(S): at 05:59

## 2024-03-24 RX ADMIN — HEPARIN SODIUM 5000 UNIT(S): 5000 INJECTION INTRAVENOUS; SUBCUTANEOUS at 17:36

## 2024-03-24 NOTE — PROGRESS NOTE ADULT - ASSESSMENT
Pt . feels fine. Offers no complaints. Hes passing stool and gas. Had diarrhea.  Abd is somewhat distended., but non tender and soft.  If he continues to do well he may be started on clears in the afternoon.  Please repeat axr in Am.
Neymar Pryor is a 64-year-old male admitted to the hospital 2 days ago for SBO thought to be due to adhesions.  Felt better after IV fluids and bowel rest and was discharged home yesterday and told to follow-up with surgery if symptoms recurred.  Last night developed increasing nausea vomiting and distention.  Complaining of pain in the left lower abdomen.  States he had loose bowel movement last night.            
Neymar Pryor is a 64-year-old male admitted to the hospital 2 days ago for SBO thought to be due to adhesions.  Felt better after IV fluids and bowel rest and was discharged home yesterday and told to follow-up with surgery if symptoms recurred.  Last night developed increasing nausea vomiting and distention.  Complaining of pain in the left lower abdomen.  States he had loose bowel movement last night.

## 2024-03-24 NOTE — PROGRESS NOTE ADULT - SUBJECTIVE AND OBJECTIVE BOX
Date of Service: 03-24-24 @ 08:26    Patient is a 64y old  Male who presents with a chief complaint of Abdominal pain, distention, nausea and vomiting. (23 Mar 2024 20:20)      INTERVAL HPI/OVERNIGHT EVENTS: Patient seen and examined. NAD. No complaints.    Vital Signs Last 24 Hrs  T(C): 36.7 (24 Mar 2024 07:52), Max: 36.7 (23 Mar 2024 15:33)  T(F): 98 (24 Mar 2024 07:52), Max: 98.1 (23 Mar 2024 15:33)  HR: 63 (24 Mar 2024 07:52) (59 - 80)  BP: 136/86 (24 Mar 2024 07:52) (136/85 - 158/91)  BP(mean): 114 (23 Mar 2024 17:30) (107 - 114)  RR: 18 (24 Mar 2024 07:52) (17 - 18)  SpO2: 95% (24 Mar 2024 07:52) (92% - 95%)    Parameters below as of 24 Mar 2024 07:52  Patient On (Oxygen Delivery Method): room air        03-23    145  |  111<H>  |  27<H>  ----------------------------<  97  3.9   |  27  |  1.03    Ca    9.2      23 Mar 2024 06:17    TPro  6.9  /  Alb  3.4  /  TBili  0.8  /  DBili  x   /  AST  63<H>  /  ALT  134<H>  /  AlkPhos  82  03-22                          14.2   5.45  )-----------( 164      ( 23 Mar 2024 06:17 )             42.7     PT/INR - ( 22 Mar 2024 17:48 )   PT: 12.1 sec;   INR: 1.08 ratio         PTT - ( 22 Mar 2024 17:48 )  PTT:29.5 sec  CAPILLARY BLOOD GLUCOSE        Urinalysis Basic - ( 23 Mar 2024 06:17 )    Color: x / Appearance: x / SG: x / pH: x  Gluc: 97 mg/dL / Ketone: x  / Bili: x / Urobili: x   Blood: x / Protein: x / Nitrite: x   Leuk Esterase: x / RBC: x / WBC x   Sq Epi: x / Non Sq Epi: x / Bacteria: x              acetaminophen  Suppository .. 650 milliGRAM(s) Rectal every 6 hours PRN  enalaprilat Injectable 1.25 milliGRAM(s) IV Push every 6 hours  heparin   Injectable 5000 Unit(s) SubCutaneous every 12 hours  lactated ringers. 1000 milliLiter(s) IV Continuous <Continuous>  pantoprazole  Injectable 40 milliGRAM(s) IV Push every 24 hours              REVIEW OF SYSTEMS:  CONSTITUTIONAL: No fever, no weight loss, or no fatigue  NECK: No pain, no stiffness  RESPIRATORY: No cough, no wheezing, no chills, no hemoptysis, No shortness of breath  CARDIOVASCULAR: No chest pain, no palpitations, no dizziness, no leg swelling  GASTROINTESTINAL: No abdominal pain. No nausea, no vomiting, no hematemesis; No diarrhea, no constipation. No melena, no hematochezia.  GENITOURINARY: No dysuria, no frequency, no hematuria, no incontinence  NEUROLOGICAL: No headaches, no loss of strength, no numbness, no tremors  SKIN: No itching, no burning  MUSCULOSKELETAL: No joint pain, no swelling; No muscle, no back, no extremity pain  PSYCHIATRIC: No depression, no mood swings,   HEME/LYMPH: No easy bruising, no bleeding gums  ALLERY AND IMMUNOLOGIC: No hives       Consultant(s) Notes Reviewed:  [X] YES  [ ] NO    PHYSICAL EXAM:  GENERAL: NAD  HEAD:  Atraumatic, Normocephalic  EYES: EOMI, PERRLA, conjunctiva and sclera clear  ENMT: No tonsillar erythema, exudates, or enlargement; Moist mucous membranes  NECK: Supple, No JVD  NERVOUS SYSTEM:  Awake & alert  CHEST/LUNG: Clear to auscultation bilaterally; No rales, rhonchi, wheezing,  HEART: Regular rate and rhythm  ABDOMEN: Soft, Nontender, less distended; Bowel sounds present  EXTREMITIES:  No clubbing, cyanosis, or edema  LYMPH: No lymphadenopathy noted  SKIN: No rashes      Advanced care planning discussed with patient/family [X] YES   [ ] NO    Advanced care planning discussed with patient/family. Patient's health status was discussed. All appropriate changes have been made regarding patient's end-of-life care. Advanced care planning forms reviewed/discussed/completed.  20 minutes spent.

## 2024-03-24 NOTE — CHART NOTE - NSCHARTNOTEFT_GEN_A_CORE
Quiet overnight per chart.  No acute events this am per RN.  Mr. Pryor personally seen/ examined during early afternoon rounds.  Reports toleration of clear liquids with flatus and BM today.  Vitals non-suggestive.  Abdomen softly distended, but non tense and non tender.  Labs reassuring.  Clinically, improving overall.  Surgically, stable for present.  To continue current supportive care.  Given readmission, will proceed with Gastrografin challenge for diagnostic +/- therapeutic purposes.  Reviewed with patient in detail, Hospitalist as well as RN team.  Please note that more than 35 minutes was spent in face to face time with greater than 50% in counseling and coordination of care. Quiet overnight per chart.  No acute events this am per RN.  Mr. Pryor personally seen/ examined during early afternoon rounds.  Reports toleration of clear liquids with flatus and BM today.  Vitals non-suggestive.  Abdomen softly distended, but non tense and non tender.  Labs reassuring.  Clinically, improving overall.  Surgically, stable for present.  To continue current supportive care.  Given readmission, will proceed with Gastrografin challenge for diagnostic +/- therapeutic purposes.  Reviewed with patient in detail, Hospitalist as well as RN team.  Please note that more than 25 minutes was spent in face to face time with greater than 50% in counseling and coordination of care.

## 2024-03-24 NOTE — PROGRESS NOTE ADULT - PROBLEM SELECTOR PLAN 1
diet per surgery  lactated ringers. 1000 milliLiter(s) (100 mL/Hr) IV Continuous <Continuous>  pantoprazole  Injectable 40 milliGRAM(s) IV Push every 24 hours
On clears -- tolerating diet  Advance diet as per surgery  IVF  pantoprazole  Injectable 40 milliGRAM(s) IV Push every 24 hours

## 2024-03-25 ENCOUNTER — TRANSCRIPTION ENCOUNTER (OUTPATIENT)
Age: 64
End: 2024-03-25

## 2024-03-25 VITALS
TEMPERATURE: 99 F | RESPIRATION RATE: 18 BRPM | OXYGEN SATURATION: 94 % | DIASTOLIC BLOOD PRESSURE: 89 MMHG | SYSTOLIC BLOOD PRESSURE: 154 MMHG | HEART RATE: 89 BPM

## 2024-03-25 LAB
ANION GAP SERPL CALC-SCNC: 7 MMOL/L — SIGNIFICANT CHANGE UP (ref 5–17)
BASOPHILS # BLD AUTO: 0.04 K/UL — SIGNIFICANT CHANGE UP (ref 0–0.2)
BASOPHILS NFR BLD AUTO: 0.6 % — SIGNIFICANT CHANGE UP (ref 0–2)
BUN SERPL-MCNC: 14 MG/DL — SIGNIFICANT CHANGE UP (ref 7–23)
CALCIUM SERPL-MCNC: 10.3 MG/DL — SIGNIFICANT CHANGE UP (ref 8.4–10.5)
CHLORIDE SERPL-SCNC: 105 MMOL/L — SIGNIFICANT CHANGE UP (ref 96–108)
CO2 SERPL-SCNC: 30 MMOL/L — SIGNIFICANT CHANGE UP (ref 22–31)
CREAT SERPL-MCNC: 1.24 MG/DL — SIGNIFICANT CHANGE UP (ref 0.5–1.3)
CULTURE RESULTS: SIGNIFICANT CHANGE UP
CULTURE RESULTS: SIGNIFICANT CHANGE UP
EGFR: 65 ML/MIN/1.73M2 — SIGNIFICANT CHANGE UP
EOSINOPHIL # BLD AUTO: 0.19 K/UL — SIGNIFICANT CHANGE UP (ref 0–0.5)
EOSINOPHIL NFR BLD AUTO: 2.8 % — SIGNIFICANT CHANGE UP (ref 0–6)
GLUCOSE SERPL-MCNC: 138 MG/DL — HIGH (ref 70–99)
HCT VFR BLD CALC: 43.7 % — SIGNIFICANT CHANGE UP (ref 39–50)
HGB BLD-MCNC: 14.9 G/DL — SIGNIFICANT CHANGE UP (ref 13–17)
IMM GRANULOCYTES NFR BLD AUTO: 0.4 % — SIGNIFICANT CHANGE UP (ref 0–0.9)
LYMPHOCYTES # BLD AUTO: 0.57 K/UL — LOW (ref 1–3.3)
LYMPHOCYTES # BLD AUTO: 8.3 % — LOW (ref 13–44)
MCHC RBC-ENTMCNC: 30.7 PG — SIGNIFICANT CHANGE UP (ref 27–34)
MCHC RBC-ENTMCNC: 34.1 GM/DL — SIGNIFICANT CHANGE UP (ref 32–36)
MCV RBC AUTO: 89.9 FL — SIGNIFICANT CHANGE UP (ref 80–100)
MONOCYTES # BLD AUTO: 0.6 K/UL — SIGNIFICANT CHANGE UP (ref 0–0.9)
MONOCYTES NFR BLD AUTO: 8.7 % — SIGNIFICANT CHANGE UP (ref 2–14)
NEUTROPHILS # BLD AUTO: 5.45 K/UL — SIGNIFICANT CHANGE UP (ref 1.8–7.4)
NEUTROPHILS NFR BLD AUTO: 79.2 % — HIGH (ref 43–77)
NRBC # BLD: 0 /100 WBCS — SIGNIFICANT CHANGE UP (ref 0–0)
PLATELET # BLD AUTO: 206 K/UL — SIGNIFICANT CHANGE UP (ref 150–400)
POTASSIUM SERPL-MCNC: 3.8 MMOL/L — SIGNIFICANT CHANGE UP (ref 3.5–5.3)
POTASSIUM SERPL-SCNC: 3.8 MMOL/L — SIGNIFICANT CHANGE UP (ref 3.5–5.3)
RBC # BLD: 4.86 M/UL — SIGNIFICANT CHANGE UP (ref 4.2–5.8)
RBC # FLD: 11.9 % — SIGNIFICANT CHANGE UP (ref 10.3–14.5)
SODIUM SERPL-SCNC: 142 MMOL/L — SIGNIFICANT CHANGE UP (ref 135–145)
SPECIMEN SOURCE: SIGNIFICANT CHANGE UP
SPECIMEN SOURCE: SIGNIFICANT CHANGE UP
WBC # BLD: 6.88 K/UL — SIGNIFICANT CHANGE UP (ref 3.8–10.5)
WBC # FLD AUTO: 6.88 K/UL — SIGNIFICANT CHANGE UP (ref 3.8–10.5)

## 2024-03-25 PROCEDURE — 86901 BLOOD TYPING SEROLOGIC RH(D): CPT

## 2024-03-25 PROCEDURE — 86900 BLOOD TYPING SEROLOGIC ABO: CPT

## 2024-03-25 PROCEDURE — 85730 THROMBOPLASTIN TIME PARTIAL: CPT

## 2024-03-25 PROCEDURE — 85025 COMPLETE CBC W/AUTO DIFF WBC: CPT

## 2024-03-25 PROCEDURE — 80048 BASIC METABOLIC PNL TOTAL CA: CPT

## 2024-03-25 PROCEDURE — 74019 RADEX ABDOMEN 2 VIEWS: CPT

## 2024-03-25 PROCEDURE — 96375 TX/PRO/DX INJ NEW DRUG ADDON: CPT

## 2024-03-25 PROCEDURE — 96361 HYDRATE IV INFUSION ADD-ON: CPT

## 2024-03-25 PROCEDURE — 80053 COMPREHEN METABOLIC PANEL: CPT

## 2024-03-25 PROCEDURE — 36415 COLL VENOUS BLD VENIPUNCTURE: CPT

## 2024-03-25 PROCEDURE — 86850 RBC ANTIBODY SCREEN: CPT

## 2024-03-25 PROCEDURE — 93005 ELECTROCARDIOGRAM TRACING: CPT

## 2024-03-25 PROCEDURE — 81003 URINALYSIS AUTO W/O SCOPE: CPT

## 2024-03-25 PROCEDURE — 96374 THER/PROPH/DIAG INJ IV PUSH: CPT

## 2024-03-25 PROCEDURE — 74176 CT ABD & PELVIS W/O CONTRAST: CPT | Mod: MC

## 2024-03-25 PROCEDURE — 74018 RADEX ABDOMEN 1 VIEW: CPT

## 2024-03-25 PROCEDURE — 85610 PROTHROMBIN TIME: CPT

## 2024-03-25 PROCEDURE — 83036 HEMOGLOBIN GLYCOSYLATED A1C: CPT

## 2024-03-25 PROCEDURE — 83690 ASSAY OF LIPASE: CPT

## 2024-03-25 PROCEDURE — 74019 RADEX ABDOMEN 2 VIEWS: CPT | Mod: 26

## 2024-03-25 PROCEDURE — 99285 EMERGENCY DEPT VISIT HI MDM: CPT

## 2024-03-25 RX ADMIN — Medication 1.25 MILLIGRAM(S): at 05:57

## 2024-03-25 RX ADMIN — HEPARIN SODIUM 5000 UNIT(S): 5000 INJECTION INTRAVENOUS; SUBCUTANEOUS at 05:57

## 2024-03-25 RX ADMIN — Medication 1.25 MILLIGRAM(S): at 11:27

## 2024-03-25 NOTE — DISCHARGE NOTE NURSING/CASE MANAGEMENT/SOCIAL WORK - PATIENT PORTAL LINK FT
You can access the FollowMyHealth Patient Portal offered by [facility] by registering at the following website: [email or web address]. By joining [employer]’s FollowMyHealth portal, you will also be able to view your health information using other applications (apps) compatible with our system.

## 2024-03-25 NOTE — PROGRESS NOTE ADULT - NSPROGADDITIONALINFOA_GEN_ALL_CORE
agree with above unless contradicts below  doing better  abdominal tightness resolved  feels good  advance to regs  tolerates d/c home

## 2024-03-25 NOTE — DISCHARGE NOTE PROVIDER - HOSPITAL COURSE
admitted for recurrent SBO  NPO initially later diet advance as tolerated  NGT per surgery  DC after surgical clearance

## 2024-03-25 NOTE — PROGRESS NOTE ADULT - SUBJECTIVE AND OBJECTIVE BOX
SURGERY    64 year old male admitted with recurrent/persistent SBO    SUBJECTIVE:   Patient seen and examined lying in bed.  Denies abdominal pain, N/V at this time.  States he is feeling well overall.  Admits to diarrhea following gastrograffin yesterday, and states he is hungry, but otherwise no complaints.  Tolerating full liquid diet    OBJECTIVE:   T(F): 98.2 (03-25-24 @ 07:30), Max: 98.2 (03-25-24 @ 07:30)  HR: 70 (03-25-24 @ 07:30) (66 - 70)  BP: 158/92 (03-25-24 @ 07:30) (142/79 - 158/92)  RR: 18 (03-25-24 @ 07:30) (17 - 18)  SpO2: 93% (03-25-24 @ 07:30) (93% - 95%)    Physical exam:  General: A+O x 3 in NAD  Chest: Equal chest expansion, non labored breathing  Heart: Pulse regular  Abdomen: soft, NT/ND, no guarding, no rigidity  Extremities: no edema noted, warm,  no calf tenderness     MEDICATIONS  (STANDING):  enalaprilat Injectable 1.25 milliGRAM(s) IV Push every 6 hours  heparin   Injectable 5000 Unit(s) SubCutaneous every 12 hours  lactated ringers. 1000 milliLiter(s) (50 mL/Hr) IV Continuous <Continuous>  pantoprazole  Injectable 40 milliGRAM(s) IV Push every 24 hours    MEDICATIONS  (PRN):  acetaminophen  Suppository .. 650 milliGRAM(s) Rectal every 6 hours PRN Mild Pain (1 - 3)      RADIOLOGY & ADDITIONAL STUDIES:  AXR: PENDING    Assessment  64 year old male admitted with recurrent/persistent SBO, tolerating full liquid diet    Follow up AXR  Consider advancement of diet pending AXR results  Case and plan D/W Dr. Saldana SURGERY    64 year old male admitted with recurrent/persistent SBO    SUBJECTIVE:   Patient seen and examined lying in bed.  Denies abdominal pain, N/V at this time.  States he is feeling well overall.  Admits to diarrhea following gastrograffin yesterday, and states he is hungry, but otherwise no complaints.  Tolerating full liquid diet    OBJECTIVE:   T(F): 98.2 (03-25-24 @ 07:30), Max: 98.2 (03-25-24 @ 07:30)  HR: 70 (03-25-24 @ 07:30) (66 - 70)  BP: 158/92 (03-25-24 @ 07:30) (142/79 - 158/92)  RR: 18 (03-25-24 @ 07:30) (17 - 18)  SpO2: 93% (03-25-24 @ 07:30) (93% - 95%)    Physical exam:  General: A+O x 3 in NAD  Chest: Equal chest expansion, non labored breathing  Heart: Pulse regular  Abdomen: soft, NT/ND, no guarding, no rigidity  Extremities: no edema noted, warm,  no calf tenderness     MEDICATIONS  (STANDING):  enalaprilat Injectable 1.25 milliGRAM(s) IV Push every 6 hours  heparin   Injectable 5000 Unit(s) SubCutaneous every 12 hours  lactated ringers. 1000 milliLiter(s) (50 mL/Hr) IV Continuous <Continuous>  pantoprazole  Injectable 40 milliGRAM(s) IV Push every 24 hours    MEDICATIONS  (PRN):  acetaminophen  Suppository .. 650 milliGRAM(s) Rectal every 6 hours PRN Mild Pain (1 - 3)      RADIOLOGY & ADDITIONAL STUDIES:  AXR: PENDING    Assessment  64 year old male admitted with recurrent/persistent SBO, tolerating full liquid diet    Follow up AXR  Consider advancement of diet pending AXR results  Case and plan D/W Dr. Cartagena

## 2024-03-25 NOTE — DISCHARGE NOTE PROVIDER - NSDCCPCAREPLAN_GEN_ALL_CORE_FT
PRINCIPAL DISCHARGE DIAGNOSIS  Diagnosis: SBO (small bowel obstruction)  Assessment and Plan of Treatment: follow up with surgeon Dr. ROSARIO

## 2024-03-25 NOTE — PROGRESS NOTE ADULT - REASON FOR ADMISSION
Abdominal pain, distention, nausea and vomiting.

## 2024-03-25 NOTE — DISCHARGE NOTE PROVIDER - CARE PROVIDER_API CALL
Randy Cartagena [facility]  [address], [address]  [place] [address]  Phone: [phone]  Fax: [phone]  Follow Up Time:     Matthew Banks  [facility]  [address], [address]  [place] [address]  Phone: [phone]  Fax: [phone]  Follow Up Time:

## 2024-03-25 NOTE — DISCHARGE NOTE PROVIDER - CARE PROVIDERS DIRECT ADDRESSES
[email or web address]

## 2024-05-01 NOTE — ED ADULT NURSE NOTE - GENITOURINARY ASSESSMENT
Treatment Goal Explanation (Does Not Render In The Note): Stable for the purposes of categorizing medical decision making is defined by the specific treatment goals for an individual patient. A patient that is not at their treatment goal is not stable, even if the condition has not changed and there is no short- term threat to life or function. Treatment Goal Met?: no - - -

## 2024-11-07 NOTE — ED ADULT NURSE NOTE - ED COMFORT CARE
Problem: Chronic Conditions and Co-morbidities  Goal: Patient's chronic conditions and co-morbidity symptoms are monitored and maintained or improved  Outcome: Progressing     Problem: Chronic Conditions and Co-morbidities  Goal: Patient's chronic conditions and co-morbidity symptoms are monitored and maintained or improved  Outcome: Progressing     
Patient informed/Warm blanket/Pillow

## 2025-01-08 PROBLEM — Z00.00 ENCOUNTER FOR PREVENTIVE HEALTH EXAMINATION: Status: ACTIVE | Noted: 2025-01-08

## 2025-06-24 ENCOUNTER — APPOINTMENT (OUTPATIENT)
Dept: SURGERY | Facility: CLINIC | Age: 65
End: 2025-06-24
Payer: COMMERCIAL

## 2025-06-24 VITALS
HEART RATE: 86 BPM | WEIGHT: 174 LBS | HEIGHT: 67 IN | OXYGEN SATURATION: 94 % | DIASTOLIC BLOOD PRESSURE: 76 MMHG | SYSTOLIC BLOOD PRESSURE: 124 MMHG | BODY MASS INDEX: 27.31 KG/M2

## 2025-06-24 PROCEDURE — 99214 OFFICE O/P EST MOD 30 MIN: CPT

## 2025-06-24 PROCEDURE — G2211 COMPLEX E/M VISIT ADD ON: CPT

## 2025-06-25 PROBLEM — Z87.442 HISTORY OF KIDNEY STONES: Status: ACTIVE | Noted: 2025-06-25

## 2025-06-25 PROBLEM — Z87.898 SUCCESSFUL PRIOR TREATMENT FOR ILLICIT DRUG USE: Status: ACTIVE | Noted: 2025-06-25

## 2025-06-25 PROBLEM — E21.0 PRIMARY HYPERPARATHYROIDISM: Status: ACTIVE | Noted: 2025-06-24

## 2025-06-25 PROBLEM — Z87.891 FORMER SMOKER: Status: ACTIVE | Noted: 2025-06-25

## 2025-06-25 PROBLEM — M85.839 OSTEOPENIA OF FOREARM, UNSPECIFIED LATERALITY: Status: ACTIVE | Noted: 2025-06-25

## 2025-06-25 PROBLEM — Z86.79 HISTORY OF HYPERTENSION: Status: RESOLVED | Noted: 2025-06-25 | Resolved: 2025-06-25

## 2025-06-25 PROBLEM — Z83.49 FAMILY HISTORY OF HYPERCALCEMIA: Status: ACTIVE | Noted: 2025-06-25

## 2025-06-25 PROBLEM — Z78.9 DENIES ALCOHOL CONSUMPTION: Status: ACTIVE | Noted: 2025-06-25

## 2025-06-25 RX ORDER — LISINOPRIL 20 MG/1
20 TABLET ORAL
Refills: 0 | Status: ACTIVE | COMMUNITY

## 2025-08-12 ENCOUNTER — APPOINTMENT (OUTPATIENT)
Dept: CT IMAGING | Facility: IMAGING CENTER | Age: 65
End: 2025-08-12

## 2025-08-21 ENCOUNTER — APPOINTMENT (OUTPATIENT)
Dept: SURGERY | Facility: CLINIC | Age: 65
End: 2025-08-21